# Patient Record
Sex: FEMALE | Race: WHITE | Employment: UNEMPLOYED
[De-identification: names, ages, dates, MRNs, and addresses within clinical notes are randomized per-mention and may not be internally consistent; named-entity substitution may affect disease eponyms.]

---

## 2020-12-16 ENCOUNTER — TELEPHONE (OUTPATIENT)
Dept: CASE MANAGEMENT | Age: 50
End: 2020-12-16

## 2020-12-16 NOTE — TELEPHONE ENCOUNTER
Patient breast surgery education packet assembled. Nurse Navigator is scheduled to met with patient at new patient appointment on 12/17/20 with Dr. Jazmín sAhford.

## 2020-12-16 NOTE — PROGRESS NOTES
Subjective:      Patient ID: Jony Lopez is a 44 y.o. female. HPI  History and Physical    Patient's Name/Date of Birth: Jony Lopez / 1970    Date: 2020      Jony Lopez presents for evaluation of newly diagnosed left DCIS breast cancer. PCP: Nikko Davidson DO, Gynecologist: Dr. Milla Paiz. The lesion is located in the central position of the left breast. The lesion was discovered by mammogram. The patient has noted a change in BSE since presentation (breast pain, \"throbbing\", intermittent). Patient denies nipple discharge. Patient denies a personal history of breast cancer. Breast cancer risk factors include age, gender. Ashkenazi Sikhism Ancestry: No.    OBSTETRIC RELATED HISTORY:  Age of menarche was 14. Patient has not has had menstrual cycles since her ovary removal at age 34. Patient denies hormonal therapy. Patient is . Age of first live birth was 23. Patient did breast feed. Is patient interested in fertility information about fertility preservation?n/a    CANCER SURVEILLANCE HISTORY:  Mammograms: Yes  Breast MRI's: No   Breast Biopsies: Yes   Colonoscopy: Yes   GI Polyps: No   EGD: Yes   Pelvic Exam: Yes   Pap Smear: Yes   Dermatology: No   Lung screening: no      Have you received your flu vaccination this year? Yes  Have you received your Pneumococcal vaccination? No      Estimated body mass index is 30.61 kg/m² as calculated from the following:    Height as of this encounter: 5' 1\" (1.549 m). Weight as of this encounter: 162 lb (73.5 kg). Bra Size: 38 DD    Because violence is so common, we ask all our patients: are you in a relationship or do you live with a person who threatens, hurts, or controls you:  Patient denies. Patient drinks little caffeinated beverages. Patient does not smoke cigarettes. Patient does not use recreational drugs.       Past Medical History:   Diagnosis Date  Diabetes mellitus, type 2 ([facility] [address]        Past Surgical History:   Procedure Laterality Date    BACK SURGERY  2011    EAR SURGERY  06/2020    OVARY REMOVAL  2009       Current Outpatient Medications   Medication Sig Dispense Refill    DULoxetine (CYMBALTA) 30 MG capsule Take 30 mg by mouth daily      traZODone (DESYREL) 100 MG tablet Take 100 mg by mouth nightly      pantoprazole (PROTONIX) 20 MG tablet Take 20 mg by mouth daily      pregabalin (LYRICA) 25 MG capsule Take 25 mg by mouth 2 times daily      naproxen (NAPROSYN) 375 MG tablet Take 1 tablet by mouth 2 times daily as needed for Pain 14 tablet 0     No current facility-administered medications for this visit.         Allergies   Allergen Reactions    Latex Shortness Of Breath    Amoxicillin Shortness Of Breath    Iodine Swelling    Pcn [Penicillins] Hives       Family History   Problem Relation Age of Onset    Cancer Maternal Grandfather         kidney       Social History     Socioeconomic History    Marital status:      Spouse name: Not on file    Number of children: Not on file    Years of education: Not on file    Highest education level: Not on file   Occupational History    Not on file   Social Needs    Financial resource strain: Not on file    Food insecurity     Worry: Not on file     Inability: Not on file    Transportation needs     Medical: Not on file     Non-medical: Not on file   Tobacco Use    Smoking status: Never Smoker   Substance and Sexual Activity    Alcohol use: No    Drug use: No    Sexual activity: Not on file   Lifestyle    Physical activity     Days per week: Not on file     Minutes per session: Not on file    Stress: Not on file   Relationships    Social connections     Talks on phone: Not on file     Gets together: Not on file     Attends Mu-ism service: Not on file     Active member of club or organization: Not on file     Attends meetings of clubs or organizations: Not on file 5. We had a discussion of the treatment options for breast cancer including risks, benefits, and recurrence rates for breast conservation therapy versus mastectomy. We discussed the indications and risks of sentinel lymph node biopsy and possibility of axillary lymph node dissection. We also discussed the possible role of systemic and radiation therapy. NCCN guidelines were utilized in our discussion. The patient was given the appropriate contact numbers and will call with any questions or concerns. Face-to-face time 45 minutes, greater than 50% in counseling, education, and coordination of care. My final recommendation will be communicated back to the referring physician by way of shared medical record and/or written letter via [address] [facility] mail. I personally and independently saw and examined patient and reviewed all pertinent laboratory data and imaging studies. I have reviewed and agree with the CNP history and review of systems as documented in the above note. This document is generated, in part, by voice recognition software and thus syntax and grammatical errors are possible. Rose Lima MD [facility]  December 17, 2020

## 2020-12-17 ENCOUNTER — OFFICE VISIT (OUTPATIENT)
Dept: BREAST CENTER | Age: 50
End: 2020-12-17
Payer: COMMERCIAL

## 2020-12-17 ENCOUNTER — TELEPHONE (OUTPATIENT)
Dept: BREAST CENTER | Age: 50
End: 2020-12-17

## 2020-12-17 VITALS
OXYGEN SATURATION: 98 % | DIASTOLIC BLOOD PRESSURE: 80 MMHG | RESPIRATION RATE: 16 BRPM | BODY MASS INDEX: 30.58 KG/M2 | WEIGHT: 162 LBS | SYSTOLIC BLOOD PRESSURE: 118 MMHG | HEIGHT: 61 IN | TEMPERATURE: 97.3 F | HEART RATE: 98 BPM

## 2020-12-17 PROCEDURE — 99204 OFFICE O/P NEW MOD 45 MIN: CPT | Performed by: SURGERY

## 2020-12-17 PROCEDURE — 99213 OFFICE O/P EST LOW 20 MIN: CPT | Performed by: SURGERY

## 2020-12-17 NOTE — PATIENT INSTRUCTIONS

## 2020-12-17 NOTE — LETTER
[facility]  [address] [address]  Phone: [phone]  Fax: [phone]    Bradley Mccall MD        December 17, 2020     Austin Oates [address] [place] [address]    Patient: Nat Yarbrough  MR Number: [id number]  YOB: 1970  Date of Visit: 12/17/2020    Dear Dr. Oates: Thank you for the request for consultation for Danii Chawla to me for the evaluation of her left DCIS. Below are the relevant portions of my assessment and plan of care. 44 y.o. woman who underwent  a stereotactic left breast core biopsy at central position on December 4 , 2020. Pathological evaluation completed at [place]:       11/25/2020; BILATERAL SCREENING MAMMOGRAM; [address] BIOPSY LEFT BREAST [facility]       12/8/2020; PATHOLOGY; [facility]        12/7/2020 SURGICAL PATHOLOGY REPORT                Clinical stage 0 left breast cancer. Long discussion about options going forward. Has have already seen Dr. Chele Rahman in consultation. Plan on left needle localized lumpectomy/LMAC. Questions answered to patient satisfaction. If you have questions, please do not hesitate to call me. I look forward to following Martinez Gonzalez along with you.     Sincerely,  Bradley Mccall MD

## 2020-12-18 ENCOUNTER — TELEPHONE (OUTPATIENT)
Dept: CASE MANAGEMENT | Age: 50
End: 2020-12-18

## 2020-12-18 NOTE — TELEPHONE ENCOUNTER
Met with patient regarding her recent breast cancer diagnosis. Instructed in detail on her breast biopsy pathology findings including cancer type (DCIS) and hormone receptor status (ER+, UT+). Instructed on next steps including breast surgery options, lymph node biopsy procedures and additional imaging that may be required. Informed patient that this is the beginning of their breast cancer journey and when treatment has been completed she will receive a [address] detailing the events of her specific treatment plan. Provided with extensive literature including \"Be A Survivor: Your guide to breast cancer treatment\", Your Guide to Your Breast Cancer Pathology Report, American Cancer society Exercises after breast surgery and Lymphedema Early Signs and Symptoms. Written materials on local and national support and informational groups. Today patient received copies of their pathology and imaging reports (if available) as well as a list of local medical oncology providers. Provided patient with my contact information, office hours, and encouragement to call me with questions or concerns. Patient verbalizes understanding and appreciative of nurse navigator visit. Emotional support provided and greater than 30 minutes spent with patient. Nurse navigator will continue to follow.

## 2020-12-31 ENCOUNTER — TELEPHONE (OUTPATIENT)
Dept: BREAST CENTER | Age: 50
End: 2020-12-31

## 2021-01-08 ENCOUNTER — TELEPHONE (OUTPATIENT)
Dept: BREAST CENTER | Age: 51
End: 2021-01-08

## 2021-01-08 NOTE — TELEPHONE ENCOUNTER
Patient surgery has been scheduled with surgery scheduling 1/27/21 @ 10:00am / NL 8:00am / Alonso Bledsoe 6:30am - Left breast NL lumpectomy - LMAC - [facility]. Patient notified of date and time of surgery. Patient was instructed to enter the [facility] entrance of the hospital. Patient was instructed NPO after midnight the night prior to surgery. Patient was instructed NO ASA products or blood thinners 5-7 days prior to surgery. Patient instruction and post op instructions sheet mailed to patient. Patient was instructed to bring a sports bra with her day of surgery. Patient will be instructed by PAT about Covid 19 test.  Patient will be tested 72-96 hours prior to surgery. No prior authorization required.   Post op visit - 2/8/21 @ 2:30pm [address] [place] [address] Dr Malone

## 2021-01-11 ENCOUNTER — PREP FOR PROCEDURE (OUTPATIENT)
Dept: SURGERY | Age: 51
End: 2021-01-11

## 2021-01-11 RX ORDER — SODIUM CHLORIDE 0.9 % (FLUSH) 0.9 %
10 SYRINGE (ML) INJECTION PRN
Status: CANCELLED | OUTPATIENT
Start: 2021-01-11

## 2021-01-11 RX ORDER — SODIUM CHLORIDE 0.9 % (FLUSH) 0.9 %
10 SYRINGE (ML) INJECTION EVERY 12 HOURS SCHEDULED
Status: CANCELLED | OUTPATIENT
Start: 2021-01-11

## 2021-01-11 RX ORDER — SODIUM CHLORIDE, SODIUM LACTATE, POTASSIUM CHLORIDE, CALCIUM CHLORIDE 600; 310; 30; 20 MG/100ML; MG/100ML; MG/100ML; MG/100ML
INJECTION, SOLUTION INTRAVENOUS CONTINUOUS
Status: CANCELLED | OUTPATIENT
Start: 2021-01-11

## 2021-01-18 NOTE — PROGRESS NOTES
Patient agreed to COVID test on 1/20 at the  [facility] (2-)   located at  off of [address].between the hours of 6 am- 2:30 pm, instructed to bring ID. Patient instructed to self isolate until day of surgery.

## 2021-01-20 ENCOUNTER — HOSPITAL ENCOUNTER (OUTPATIENT)
Age: 51
Discharge: HOME | End: 2021-01-22
Payer: COMMERCIAL

## 2021-01-20 DIAGNOSIS — U07.1 COVID-19: ICD-10-CM

## 2021-01-20 PROCEDURE — U0003 INFECTIOUS AGENT DETECTION BY NUCLEIC ACID (DNA OR RNA); SEVERE ACUTE RESPIRATORY SYNDROME CORONAVIRUS 2 (SARS-COV-2) (CORONAVIRUS DISEASE [COVID-19]), AMPLIFIED PROBE TECHNIQUE, MAKING USE OF HIGH THROUGHPUT TECHNOLOGIES AS DESCRIBED BY CMS-2020-01-R: HCPCS

## 2021-01-21 LAB
SARS-COV-2: NOT DETECTED
SOURCE: NORMAL

## 2021-01-21 RX ORDER — FEXOFENADINE HCL AND PSEUDOEPHEDRINE HCI 180; 240 MG/1; MG/1
1 TABLET, EXTENDED RELEASE ORAL DAILY
COMMUNITY

## 2021-01-21 RX ORDER — MORPHINE SULFATE 15 MG/1
15 TABLET, FILM COATED, EXTENDED RELEASE ORAL 2 TIMES DAILY
COMMUNITY
Start: 2020-12-15

## 2021-01-21 RX ORDER — LISINOPRIL 2.5 MG/1
2.5 TABLET ORAL DAILY
COMMUNITY
Start: 2020-12-21

## 2021-01-21 RX ORDER — LEVOTHYROXINE SODIUM 0.05 MG/1
50 TABLET ORAL DAILY
COMMUNITY
Start: 2020-12-19

## 2021-01-21 RX ORDER — DULOXETIN HYDROCHLORIDE 60 MG/1
60 CAPSULE, DELAYED RELEASE ORAL DAILY
COMMUNITY
Start: 2020-12-15

## 2021-01-21 RX ORDER — PANTOPRAZOLE SODIUM 40 MG/1
40 TABLET, DELAYED RELEASE ORAL DAILY
COMMUNITY
Start: 2020-11-24

## 2021-01-21 RX ORDER — FLUTICASONE PROPIONATE 50 MCG
SPRAY, SUSPENSION (ML) NASAL DAILY PRN
COMMUNITY
Start: 2020-11-23

## 2021-01-21 RX ORDER — CHLORAL HYDRATE 500 MG
3 CAPSULE ORAL DAILY
COMMUNITY

## 2021-01-21 NOTE — PROGRESS NOTES
[address] PRE-ADMISSION TESTING GENERAL INSTRUCTIONS- [facility]-phone number:[phone]    GENERAL INSTRUCTIONS  [x] Antibacterial Soap shower Night before and/or AM of Surgery    [x] Nothing by mouth after midnight, including gum, candy, mints, or water. [x] You may brush your teeth, gargle, but do NOT swallow water. [x]No smoking, chewing tobacco, illegal drugs, or alcohol within 24 hours of your surgery. [x] Jewelry, valuables or body piercing's should not be brought to the hospital. All body and/or tongue piercing's must be removed prior to arriving to hospital.  ALL hair pins must be removed. [x] Do not wear makeup, lotions, powders, deodorant. Nail polish as directed by the nurse. [x] Arrange transportation with a responsible adult  to and from the hospital. If you do not have a responsible adult  to transport you, you will need to make arrangements with a medical transportation company (i.e. [employer]. A Uber/taxi/bus is not appropriate unless you are accompanied by a responsible adult ). Arrange for someone to be with you for the remainder of the day and for 24 hours after your procedure due to having had anesthesia. Who will be your  for transportation? Jamieh Barrier, spouse  Who will be staying with you for 24 hrs after your procedure? Bryanna Barrier, spouse  [x] [employer] card and photo ID. PARKING INSTRUCTIONS:   [x] Arrival Time:6:30 am Park on [facility], enter the main entrance. One person may accompany you. Wear a mask. [x] To reach the [facility] lobby from [facility], upon entering the hospital, take elevator B to the 3rd floor. Check in with the . A parking token will be provided if needed. EDUCATION INSTRUCTIONS:          [x]Pain: Post-op pain is normal and to be expected. You will be asked to rate your pain from 0-10 (a zero is not acceptable-education is needed).  Your post-op pain goal is:  Patient has a pain

## 2021-01-25 NOTE — PROGRESS NOTES
Left message with patient and spoke to her  to notify of time change for surgery to 0930 and to be here at 0600.

## 2021-01-25 NOTE — H&P
Patient ID: Cydney Lopez is a 44 y.o. female.     HPI  History and Physical     Patient's Name/Date of Birth: Cydney Lopez / 1970        Cydney Lopez presents for needle localized lumpectomy for left DCIS breast cancer.      PCP: Keri Douglas DO, Gynecologist: Dr. Jesi Mckeon.     The lesion is located in the central position of the left breast. The lesion was discovered by mammogram. The patient has noted a change in BSE since presentation (breast pain, \"throbbing\", intermittent). Patient denies nipple discharge. Patient denies a personal history of breast cancer. Breast cancer risk factors include age, gender. Ashkenazi Baptist Ancestry: No.     OBSTETRIC RELATED HISTORY:  Age of menarche was 14. Patient has not has had menstrual cycles since her ovary removal at age 34. Patient denies hormonal therapy. Patient is . Age of first live birth was 23. Patient did breast feed. Is patient interested in fertility information about fertility preservation?n/a     CANCER SURVEILLANCE HISTORY:  Mammograms: Yes  Breast MRI's: No   Breast Biopsies: Yes   Colonoscopy: Yes   GI Polyps: No   EGD: Yes   Pelvic Exam: Yes   Pap Smear: Yes   Dermatology: No   Lung screening: no        Have you received your flu vaccination this year? Yes  Have you received your Pneumococcal vaccination? No        Estimated body mass index is 30.61 kg/m² as calculated from the following:    Height as of this encounter: 5' 1\" (1.549 m). Weight as of this encounter: 162 lb (73.5 kg). Bra Size: 38 DD     Because violence is so common, we ask all our patients: are you in a relationship or do you live with a person who threatens, hurts, or controls you:  Patient denies.     Patient drinks little caffeinated beverages. Patient does not smoke cigarettes.  Patient does not use recreational drugs.        Past Medical History        Past Medical History:   Diagnosis Date    Diabetes mellitus, type 2 [facility])              Past Surgical History         Past Surgical History:   Procedure Laterality Date    BACK SURGERY   2011    EAR SURGERY   06/2020    OVARY REMOVAL   2009            Current Facility-Administered Medications          Current Outpatient Medications   Medication Sig Dispense Refill    DULoxetine (CYMBALTA) 30 MG capsule Take 30 mg by mouth daily        traZODone (DESYREL) 100 MG tablet Take 100 mg by mouth nightly        pantoprazole (PROTONIX) 20 MG tablet Take 20 mg by mouth daily        pregabalin (LYRICA) 25 MG capsule Take 25 mg by mouth 2 times daily        naproxen (NAPROSYN) 375 MG tablet Take 1 tablet by mouth 2 times daily as needed for Pain 14 tablet 0      No current facility-administered medications for this visit.             Allergies   Allergen Reactions    Latex Shortness Of Breath    Amoxicillin Shortness Of Breath    Iodine Swelling    Pcn [Penicillins] Hives         Family History         Family History   Problem Relation Age of Onset    Cancer Maternal Grandfather           kidney            Social History               Socioeconomic History    Marital status:        Spouse name: Not on file    Number of children: Not on file    Years of education: Not on file    Highest education level: Not on file   Occupational History    Not on file   Social Needs    Financial resource strain: Not on file    Food insecurity       Worry: Not on file       Inability: Not on file    Transportation needs       Medical: Not on file       Non-medical: Not on file   Tobacco Use    Smoking status: Never Smoker   Substance and Sexual Activity    Alcohol use: No    Drug use: No    Sexual activity: Not on file   Lifestyle    Physical activity       Days per week: Not on file       Minutes per session: Not on file    Stress: Not on file   Relationships    Social connections       Talks on phone: Not on file       Gets together: Not on file       Attends Caodaism service: Not on file       Active member of club or organization: Not on file       Attends meetings of clubs or organizations: Not on file       Relationship status: Not on file    Intimate partner violence       Fear of current or ex partner: Not on file       Emotionally abused: Not on file       Physically abused: Not on file       Forced sexual activity: Not on file   Other Topics Concern    Not on file   Social History Narrative    Not on file            Occupation: Patient retired. No heavy lifting activities. Enjoys natural diet interventions.            Review of Systems  CONSTITUTIONAL: No fever, chills. Good appetite and energy level. ENMT: Eyes: No diplopia; Nose: No epistaxis. Mouth: No sore throat. RESPIRATORY: No hemoptysis, shortness of breath, cough. CARDIOVASCULAR: No chest pain, pressure, or palpitations. GASTROINTESTINAL: No nausea/vomiting, abdominal pain, diarrhea/constipation. No blood in the stools. GENITOURINARY: No dysuria, urinary frequency, hematuria. NEURO: No syncope, presyncope, headache. Remainder:  ROS NEGATIVE     Patient denies previous history of DVT/PE. Review of systems as noted above completely reviewed with patient. No changes.             Objective:   Physical Exam  Vitals signs and nursing note reviewed. Constitutional:       General: She is not in acute distress. Appearance: She is well-developed. She is not diaphoretic. HENT:      Head: Normocephalic and atraumatic. Eyes:      Conjunctiva/sclera: Conjunctivae normal.      Pupils: Pupils are equal, round, and reactive to light. Neck:      Musculoskeletal: Normal range of motion and neck supple. Thyroid: No thyromegaly. Trachea: No tracheal deviation. Cardiovascular:      Rate and Rhythm: Normal rate and regular rhythm. Heart sounds: Normal heart sounds. Pulmonary:      Effort: Pulmonary effort is normal. No respiratory distress. Breath sounds: Normal breath sounds. Chest:      Breasts: Breasts are symmetrical.         Right: No inverted nipple, mass, nipple discharge, skin change or tenderness. Left: Mass and skin change (ecchymoses) present. No inverted nipple, nipple discharge or tenderness. Abdominal:      General: There is no distension. Palpations: Abdomen is soft. Musculoskeletal:      Right shoulder: She exhibits normal range of motion. Left shoulder: She exhibits normal range of motion. Lymphadenopathy:      Cervical: No cervical adenopathy. Upper Body:      Right upper body: No supraclavicular adenopathy. Left upper body: No supraclavicular adenopathy. Skin:     General: Skin is warm and dry. Coloration: Skin is not pale. Findings: No erythema. Neurological:      Mental Status: She is alert and oriented to person, place, and time. Psychiatric:         Behavior: Behavior normal.         Thought Content: Thought content normal.         Judgment: Judgment normal.                        Assessment:   *44 y.o. woman who underwent  a stereotactic left breast core biopsy at central position on December 4 , 2020. Pathological evaluation completed at [place]:        11/25/2020; BILATERAL SCREENING MAMMOGRAM; [address] [facility] BIOPSY LEFT BREAST [facility]        12/8/2020; PATHOLOGY; [facility]          12/7/2020 SURGICAL PATHOLOGY REPORT                     Clinical stage 0 left breast cancer. Long discussion about options going forward. Has have already seen Dr. Claudio Mcclain in consultation. Plan on left needle localized lumpectomy/LMAC. Questions answered to patient satisfaction. Plan: Left needle localized lumpectomy.     The risks, benefits, expected outcomes, and alternatives to this procedure have been discussed with the patient, which include but are not limited to bleeding, infection, flap necrosis and medical complications to anesthesia or surgery such as pneumonia, heart

## 2021-01-26 ENCOUNTER — ANESTHESIA EVENT (OUTPATIENT)
Dept: OPERATING ROOM | Age: 51
End: 2021-01-26
Payer: COMMERCIAL

## 2021-01-27 ENCOUNTER — ANESTHESIA (OUTPATIENT)
Dept: OPERATING ROOM | Age: 51
End: 2021-01-27
Payer: COMMERCIAL

## 2021-01-27 ENCOUNTER — HOSPITAL ENCOUNTER (OUTPATIENT)
Age: 51
Setting detail: OUTPATIENT SURGERY
Discharge: HOME | End: 2021-01-27
Attending: SURGERY | Admitting: SURGERY
Payer: COMMERCIAL

## 2021-01-27 ENCOUNTER — APPOINTMENT (OUTPATIENT)
Dept: GENERAL RADIOLOGY | Age: 51
End: 2021-01-27
Attending: SURGERY
Payer: COMMERCIAL

## 2021-01-27 ENCOUNTER — HOSPITAL ENCOUNTER (OUTPATIENT)
Dept: GENERAL RADIOLOGY | Age: 51
Setting detail: OUTPATIENT SURGERY
Discharge: HOME | End: 2021-01-29
Attending: SURGERY
Payer: COMMERCIAL

## 2021-01-27 VITALS
DIASTOLIC BLOOD PRESSURE: 57 MMHG | WEIGHT: 164 LBS | BODY MASS INDEX: 30.18 KG/M2 | HEIGHT: 62 IN | HEART RATE: 101 BPM | RESPIRATION RATE: 16 BRPM | OXYGEN SATURATION: 97 % | TEMPERATURE: 97 F | SYSTOLIC BLOOD PRESSURE: 102 MMHG

## 2021-01-27 VITALS
RESPIRATION RATE: 9 BRPM | OXYGEN SATURATION: 100 % | SYSTOLIC BLOOD PRESSURE: 70 MMHG | DIASTOLIC BLOOD PRESSURE: 49 MMHG

## 2021-01-27 DIAGNOSIS — Z01.818 PRE-OP TESTING: ICD-10-CM

## 2021-01-27 DIAGNOSIS — D05.12 DUCTAL CARCINOMA IN SITU (DCIS) OF LEFT BREAST: ICD-10-CM

## 2021-01-27 DIAGNOSIS — U07.1 COVID-19: Primary | ICD-10-CM

## 2021-01-27 LAB — METER GLUCOSE: 102 MG/DL (ref 74–99)

## 2021-01-27 PROCEDURE — 3700000000 HC ANESTHESIA ATTENDED CARE: Performed by: SURGERY

## 2021-01-27 PROCEDURE — 2720000010 HC SURG SUPPLY STERILE: Performed by: SURGERY

## 2021-01-27 PROCEDURE — 88307 TISSUE EXAM BY PATHOLOGIST: CPT

## 2021-01-27 PROCEDURE — 19125 EXCISION BREAST LESION: CPT | Performed by: SURGERY

## 2021-01-27 PROCEDURE — 82962 GLUCOSE BLOOD TEST: CPT

## 2021-01-27 PROCEDURE — 3600000013 HC SURGERY LEVEL 3 ADDTL 15MIN: Performed by: SURGERY

## 2021-01-27 PROCEDURE — 2500000003 HC RX 250 WO HCPCS: Performed by: SURGERY

## 2021-01-27 PROCEDURE — 2580000003 HC RX 258: Performed by: SURGERY

## 2021-01-27 PROCEDURE — 3700000001 HC ADD 15 MINUTES (ANESTHESIA): Performed by: SURGERY

## 2021-01-27 PROCEDURE — A4648 IMPLANTABLE TISSUE MARKER: HCPCS

## 2021-01-27 PROCEDURE — 3600000003 HC SURGERY LEVEL 3 BASE: Performed by: SURGERY

## 2021-01-27 PROCEDURE — 7100000011 HC PHASE II RECOVERY - ADDTL 15 MIN: Performed by: SURGERY

## 2021-01-27 PROCEDURE — 2709999900 HC NON-CHARGEABLE SUPPLY: Performed by: SURGERY

## 2021-01-27 PROCEDURE — 6360000002 HC RX W HCPCS

## 2021-01-27 PROCEDURE — 76098 X-RAY EXAM SURGICAL SPECIMEN: CPT

## 2021-01-27 PROCEDURE — 7100000010 HC PHASE II RECOVERY - FIRST 15 MIN: Performed by: SURGERY

## 2021-01-27 PROCEDURE — 2500000003 HC RX 250 WO HCPCS

## 2021-01-27 RX ORDER — KETOROLAC TROMETHAMINE 30 MG/ML
INJECTION, SOLUTION INTRAMUSCULAR; INTRAVENOUS PRN
Status: DISCONTINUED | OUTPATIENT
Start: 2021-01-27 | End: 2021-01-27 | Stop reason: SDUPTHER

## 2021-01-27 RX ORDER — ONDANSETRON 2 MG/ML
INJECTION INTRAMUSCULAR; INTRAVENOUS PRN
Status: DISCONTINUED | OUTPATIENT
Start: 2021-01-27 | End: 2021-01-27 | Stop reason: SDUPTHER

## 2021-01-27 RX ORDER — SODIUM CHLORIDE, SODIUM LACTATE, POTASSIUM CHLORIDE, CALCIUM CHLORIDE 600; 310; 30; 20 MG/100ML; MG/100ML; MG/100ML; MG/100ML
INJECTION, SOLUTION INTRAVENOUS CONTINUOUS
Status: DISCONTINUED | OUTPATIENT
Start: 2021-01-27 | End: 2021-01-27 | Stop reason: HOSPADM

## 2021-01-27 RX ORDER — HYDROCODONE BITARTRATE AND ACETAMINOPHEN 5; 325 MG/1; MG/1
1 TABLET ORAL PRN
Status: DISCONTINUED | OUTPATIENT
Start: 2021-01-27 | End: 2021-01-27 | Stop reason: HOSPADM

## 2021-01-27 RX ORDER — HYDROCODONE BITARTRATE AND ACETAMINOPHEN 5; 325 MG/1; MG/1
2 TABLET ORAL PRN
Status: DISCONTINUED | OUTPATIENT
Start: 2021-01-27 | End: 2021-01-27 | Stop reason: HOSPADM

## 2021-01-27 RX ORDER — FENTANYL CITRATE 50 UG/ML
INJECTION, SOLUTION INTRAMUSCULAR; INTRAVENOUS PRN
Status: DISCONTINUED | OUTPATIENT
Start: 2021-01-27 | End: 2021-01-27 | Stop reason: SDUPTHER

## 2021-01-27 RX ORDER — CLINDAMYCIN PHOSPHATE 900 MG/50ML
900 INJECTION INTRAVENOUS ONCE
Status: COMPLETED | OUTPATIENT
Start: 2021-01-27 | End: 2021-01-27

## 2021-01-27 RX ORDER — SODIUM CHLORIDE 0.9 % (FLUSH) 0.9 %
10 SYRINGE (ML) INJECTION EVERY 12 HOURS SCHEDULED
Status: DISCONTINUED | OUTPATIENT
Start: 2021-01-27 | End: 2021-01-27 | Stop reason: HOSPADM

## 2021-01-27 RX ORDER — LIDOCAINE HYDROCHLORIDE 20 MG/ML
INJECTION, SOLUTION INTRAVENOUS PRN
Status: DISCONTINUED | OUTPATIENT
Start: 2021-01-27 | End: 2021-01-27 | Stop reason: SDUPTHER

## 2021-01-27 RX ORDER — MIDAZOLAM HYDROCHLORIDE 1 MG/ML
INJECTION INTRAMUSCULAR; INTRAVENOUS PRN
Status: DISCONTINUED | OUTPATIENT
Start: 2021-01-27 | End: 2021-01-27 | Stop reason: SDUPTHER

## 2021-01-27 RX ORDER — SODIUM CHLORIDE 0.9 % (FLUSH) 0.9 %
10 SYRINGE (ML) INJECTION PRN
Status: DISCONTINUED | OUTPATIENT
Start: 2021-01-27 | End: 2021-01-27 | Stop reason: HOSPADM

## 2021-01-27 RX ORDER — BUPIVACAINE HYDROCHLORIDE AND EPINEPHRINE 5; 5 MG/ML; UG/ML
INJECTION, SOLUTION EPIDURAL; INTRACAUDAL; PERINEURAL PRN
Status: DISCONTINUED | OUTPATIENT
Start: 2021-01-27 | End: 2021-01-27 | Stop reason: ALTCHOICE

## 2021-01-27 RX ORDER — ACETAMINOPHEN 500 MG
500 TABLET ORAL 4 TIMES DAILY PRN
Qty: 120 TABLET | Refills: 0 | COMMUNITY
Start: 2021-01-27

## 2021-01-27 RX ORDER — IBUPROFEN 600 MG/1
600 TABLET ORAL 4 TIMES DAILY PRN
Qty: 40 TABLET | Refills: 0 | COMMUNITY
Start: 2021-01-27

## 2021-01-27 RX ORDER — PROPOFOL 10 MG/ML
INJECTION, EMULSION INTRAVENOUS CONTINUOUS PRN
Status: DISCONTINUED | OUTPATIENT
Start: 2021-01-27 | End: 2021-01-27 | Stop reason: SDUPTHER

## 2021-01-27 RX ADMIN — PHENYLEPHRINE HYDROCHLORIDE 100 MCG: 10 INJECTION INTRAVENOUS at 10:21

## 2021-01-27 RX ADMIN — MIDAZOLAM 2 MG: 1 INJECTION INTRAMUSCULAR; INTRAVENOUS at 09:45

## 2021-01-27 RX ADMIN — KETOROLAC TROMETHAMINE 30 MG: 30 INJECTION, SOLUTION INTRAMUSCULAR; INTRAVENOUS at 10:56

## 2021-01-27 RX ADMIN — SODIUM CHLORIDE, POTASSIUM CHLORIDE, SODIUM LACTATE AND CALCIUM CHLORIDE: 600; 310; 30; 20 INJECTION, SOLUTION INTRAVENOUS at 09:49

## 2021-01-27 RX ADMIN — ONDANSETRON HYDROCHLORIDE 4 MG: 2 INJECTION, SOLUTION INTRAMUSCULAR; INTRAVENOUS at 10:56

## 2021-01-27 RX ADMIN — PHENYLEPHRINE HYDROCHLORIDE 100 MCG: 10 INJECTION INTRAVENOUS at 10:41

## 2021-01-27 RX ADMIN — FENTANYL CITRATE 25 MCG: 50 INJECTION, SOLUTION INTRAMUSCULAR; INTRAVENOUS at 10:09

## 2021-01-27 RX ADMIN — PHENYLEPHRINE HYDROCHLORIDE 50 MCG: 10 INJECTION INTRAVENOUS at 10:56

## 2021-01-27 RX ADMIN — FENTANYL CITRATE 25 MCG: 50 INJECTION, SOLUTION INTRAMUSCULAR; INTRAVENOUS at 10:36

## 2021-01-27 RX ADMIN — CLINDAMYCIN IN 5 PERCENT DEXTROSE 900 MG: 18 INJECTION, SOLUTION INTRAVENOUS at 09:52

## 2021-01-27 RX ADMIN — FENTANYL CITRATE 50 MCG: 50 INJECTION, SOLUTION INTRAMUSCULAR; INTRAVENOUS at 09:52

## 2021-01-27 RX ADMIN — PHENYLEPHRINE HYDROCHLORIDE 100 MCG: 10 INJECTION INTRAVENOUS at 11:01

## 2021-01-27 RX ADMIN — LIDOCAINE HYDROCHLORIDE 40 MG: 20 INJECTION, SOLUTION INTRAVENOUS at 09:52

## 2021-01-27 RX ADMIN — PHENYLEPHRINE HYDROCHLORIDE 100 MCG: 10 INJECTION INTRAVENOUS at 10:31

## 2021-01-27 RX ADMIN — PROPOFOL 100 MCG/KG/MIN: 10 INJECTION, EMULSION INTRAVENOUS at 09:52

## 2021-01-27 RX ADMIN — PHENYLEPHRINE HYDROCHLORIDE 100 MCG: 10 INJECTION INTRAVENOUS at 10:50

## 2021-01-27 ASSESSMENT — PULMONARY FUNCTION TESTS
PIF_VALUE: 0

## 2021-01-27 ASSESSMENT — PAIN SCALES - GENERAL: PAINLEVEL_OUTOF10: 0

## 2021-01-27 ASSESSMENT — PAIN - FUNCTIONAL ASSESSMENT: PAIN_FUNCTIONAL_ASSESSMENT: 0-10

## 2021-01-27 NOTE — OP NOTE
Operative Note      Patient: Sathish Christie  YOB: 1970  MRN: [id number]    Date of Procedure: 1/27/2021    Pre-Op Diagnosis: BREAST CANCER, DCIS, left    Post-Op Diagnosis: Same       Procedure(s):  LEFT BREAST NEEDLE LOCALIZED  LUMPECTOMY --TRIDENT    Surgeon(s):  Susie Brown MD    Assistant:   Resident: Nikita Peterson DO    Anesthesia: Monitor Anesthesia Care    Estimated Blood Loss (mL): less than 50     Complications: None    Specimens:   ID Type Source Tests Collected by Time Destination   A : Left lumpectomy 6:00 Tissue Breast SURGICAL PATHOLOGY Susie Brown MD 1/27/2021 1047        Implants:  * No implants in log *      Drains: * No LDAs found *    Findings: Breast neoplasm containing localizing needle    Detailed Description of Procedure: The patient was brought to the operating room and positioned supine on the OR table. Sequential compression devices were placed on the patient's lower extremities and functioning. Preoperative antibiotics were administered. Anesthesia was obtained without complication as per the anesthesia record. Immediately prior to the procedure a time-out was called and the surgical checklist was reviewed. The patient was prepped and draped in the usual sterile fashion and the procedure went forth with strict aseptic technique under maximal barrier precautions. The left breast mass had been previously needle localized per Radiology. A curved elliptical incision was made using the PEAK Plasma Blade in the inferior portion of the dermal areolar junction. Dissection was continued using the Plasma Blade. Once palpable within the tissue, the localizing needle was grasped with Allis clamps. Dissection was carried down onto the localizing needle several centimeters proximal to the mass. The needle was cut with wire cutters and the cut external portion of the needle was passed off the field. Dissection continued using the Plasma Blade for the remaining margins. Circumferentially excellent margins were obtained. This was later confirmed by intraoperative specimen mammography. Bleeding points were all cauterized. The lumpectomy cavity was packed with a lap sponge. The specimen was removed and brought to the back table where 6 colors of paint were applied to orient it. The wound was irrigated, inspected, bleeding points were cauterized. Once hemostasis was obtained, the margins were marked with stainless steel clips. The subcutaneous tissue was approximated with 3-0 Vicryl. Dermis was approximated with interrupted 4-0 Vicryl sutures and the skin was approximated with a running subcuticular 5-0 Vicryl suture. Needle, sponge, and instrument counts were reported as correct x2. The patient tolerated the procedure well without complications. Dr. Oly Goldman was present and scrubbed throughout the case.     Electronically signed by Yelena Jauregui DO on 1/27/2021 at 11:21 AM

## 2021-01-27 NOTE — ANESTHESIA PRE PROCEDURE
Department of Anesthesiology  Preprocedure Note       Name:  Bryant Henderson   Age:  44 y.o.  :  1970                                          MRN:  [id number]         Date:  2021      Surgeon: Radha Booker):  Darius Arizmendi MD    Procedure: Procedure(s):  LEFT BREAST NEEDLE LOCALIZED  LUMPECTOMY --TRIDENT    Medications prior to admission:   Prior to Admission medications    Medication Sig Start Date End Date Taking? Authorizing Provider   morphine (MS CONTIN) 15 MG extended release tablet Take 15 mg by mouth 2 times daily. 12/15/20  Yes Historical Provider, MD   pantoprazole (PROTONIX) 40 MG tablet Take 40 mg by mouth daily 20  Yes Historical Provider, MD   LYRICA 75 MG capsule Take 75 mg by mouth 2 times daily.  20  Yes Historical Provider, MD   metFORMIN (GLUCOPHAGE) 500 MG tablet Take 500 mg by mouth 2 times daily 20  Yes Historical Provider, MD   lisinopril (PRINIVIL;ZESTRIL) 2.5 MG tablet Take 2.5 mg by mouth daily 20  Yes Historical Provider, MD   Cholecalciferol (VITAMIN D3) 25 MCG (1000 UT) CAPS Take 1,000 Int'l Units by mouth daily 20  Yes Historical Provider, MD   DULoxetine (CYMBALTA) 60 MG extended release capsule Take 60 mg by mouth daily 12/15/20  Yes Historical Provider, MD   levothyroxine (SYNTHROID) 50 MCG tablet Take 50 mcg by mouth daily 20  Yes Historical Provider, MD   fluticasone (FLONASE) 50 MCG/ACT nasal spray by Each Nostril route daily as needed 2 sprays each nostril 20  Yes Historical Provider, MD   fexofenadine-pseudoephedrine (ALLEGRA-D 24HR) 180-240 MG per extended release tablet Take 1 tablet by mouth daily   Yes Historical Provider, MD   Probiotic Product (PRO-BIOTIC BLEND) CAPS Take 3 tablets by mouth daily   Yes Historical Provider, MD   [place]-3 1000 MG CAPS Take 3 capsules by mouth daily With Omega 7 combination capsule gel   Yes Historical Provider, MD NONFORMULARY Take by mouth daily Pre - biotic  \"mixes 1 tsp with 16 ounces of water\"   Yes Historical Provider, MD   traZODone (DESYREL) 100 MG tablet Take 100 mg by mouth nightly    Historical Provider, MD       Current medications:    Current Facility-Administered Medications   Medication Dose Route Frequency Provider Last Rate Last Admin    ceFAZolin (ANCEF) 2000 mg in sterile water 20 mL IV syringe  2 g Intravenous On Call to [facility], MD        lactated ringers infusion   Intravenous Continuous Rosio Mercer MD        sodium chloride flush 0.9 % injection 10 mL  10 mL Intravenous 2 times per day Rosio Mercer MD        sodium chloride flush 0.9 % injection 10 mL  10 mL Intravenous PRN Rosio Mercer MD        HYDROcodone-acetaminophen [facility]) 5-325 MG per tablet 1 tablet  1 tablet Oral PRN Janes Mancuso MD        Or    HYDROcodone-acetaminophen (NORCO) 5-325 MG per tablet 2 tablet  2 tablet Oral PRN Janes Mancuso MD           Allergies: Allergies   Allergen Reactions    Latex Shortness Of Breath    Amoxicillin Shortness Of Breath    Iodine Swelling    Pcn [Penicillins] Hives       Problem List:  There is no problem list on file for this patient. Past Medical History:        Diagnosis Date    Diabetes mellitus, type 2 [facility])        Past Surgical History:        Procedure Laterality Date    BACK SURGERY  2011    EAR SURGERY  06/2020    ENDOMETRIAL ABLATION  2008    OVARY REMOVAL  2009    SPINAL FUSION  07/17/2011    360 fusion L5-S1       Social History:    Social History     Tobacco Use    Smoking status: Never Smoker    Smokeless tobacco: Never Used   Substance Use Topics    Alcohol use:  No                                Counseling given: Not Answered      Vital Signs (Current):   Vitals:    01/21/21 1413 01/27/21 0615   BP:  (!) 140/67   Pulse:  95   Resp:  20   Temp:  97 °F (36.1 °C)   TempSrc:  Temporal   SpO2:  97%   Weight: 164 lb (74.4 kg) 164 lb (74.4 kg) Height: 5' 1.5\" (1.562 m) 5' 1.5\" (1.562 m)                                              BP Readings from Last 3 Encounters:   01/27/21 (!) 140/67   12/17/20 118/80       NPO Status: Time of last liquid consumption: 2330                        Time of last solid consumption: 2000                        Date of last liquid consumption: 01/26/21                        Date of last solid food consumption: 01/26/21    BMI:   Wt Readings from Last 3 Encounters:   01/27/21 164 lb (74.4 kg)   12/17/20 162 lb (73.5 kg)     Body mass index is 30.49 kg/m². CBC:   Lab Results   Component Value Date    WBC 17.7 07/24/2011    RBC 3.15 07/24/2011    HGB 9.4 07/24/2011    HCT 27.9 07/24/2011    MCV 88.6 07/24/2011    RDW 13.0 07/24/2011     07/24/2011       CMP: No results found for: NA, K, CL, CO2, BUN, CREATININE, GFRAA, AGRATIO, LABGLOM, GLUCOSE, PROT, CALCIUM, BILITOT, ALKPHOS, AST, ALT    POC Tests: No results for input(s): POCGLU, POCNA, POCK, POCCL, POCBUN, POCHEMO, POCHCT in the last 72 hours.     Coags: No results found for: PROTIME, INR, APTT    HCG (If Applicable): No results found for: PREGTESTUR, PREGSERUM, HCG, HCGQUANT     ABGs: No results found for: PHART, PO2ART, NXK1OWF, LYO5NTM, BEART, Y2QYWHRB     Type & Screen (If Applicable):  No results found for: LABABO, LABRH    Drug/Infectious Status (If Applicable):  No results found for: HIV, HEPCAB    COVID-19 Screening (If Applicable):   Lab Results   Component Value Date    COVID19 Not Detected 01/20/2021         Anesthesia Evaluation  Patient summary reviewed no history of anesthetic complications:   Airway: Mallampati: II  TM distance: >3 FB   Neck ROM: full   Dental:    (+) lower dentures and upper dentures      Pulmonary:Negative Pulmonary ROS breath sounds clear to auscultation                             Cardiovascular:    (+) hypertension:,         Rhythm: regular  Rate: normal           Beta Blocker:  Not on Beta Blocker         Neuro/Psych: Negative Neuro/Psych ROS              GI/Hepatic/Renal:   (+) GERD:,           Endo/Other:    (+) DiabetesType II DM, , hypothyroidism::., .                  ROS comment: obese Abdominal:           Vascular: negative vascular ROS. Anesthesia Plan      MAC     ASA 3       Induction: intravenous. Anesthetic plan and risks discussed with patient. Plan discussed with CRNA.                   Gayatri Retana MD   1/27/2021

## 2021-01-27 NOTE — PROGRESS NOTES
COVID testing completed on: 1/20/21  Results of the test: negative  The patient verbally confirms that they did adhere to the self-quarantine guidelines. No signs or symptoms expressed or observed.

## 2021-01-27 NOTE — ANESTHESIA POSTPROCEDURE EVALUATION
Department of Anesthesiology  Postprocedure Note    Patient: Margo Jc  MRN: [id number]  YOB: 1970  Date of evaluation: 1/27/2021  Time:  12:08 PM     Procedure Summary     Date: 01/27/21 Room / Location: [place] [address] / [facility]    Anesthesia Start: 0561 Anesthesia Stop: 3620    Procedure: LEFT BREAST NEEDLE LOCALIZED  LUMPECTOMY --TRIDENT (Left Breast) Diagnosis: (BREAST CANCER)    Surgeons: Maribell Madison MD Responsible Provider: Marianne Pathak MD    Anesthesia Type: MAC ASA Status: 3          Anesthesia Type: MAC    Kelby Phase I: Kelby Score: 10    Kelby Phase II: Kelby Score: 10    Last vitals: Reviewed and per EMR flowsheets.        Anesthesia Post Evaluation    Patient location during evaluation: PACU  Patient participation: complete - patient participated  Level of consciousness: awake and alert  Airway patency: patent  Nausea & Vomiting: no nausea and no vomiting  Complications: no  Cardiovascular status: hemodynamically stable  Respiratory status: acceptable  Hydration status: euvolemic

## 2021-01-28 ENCOUNTER — TELEPHONE (OUTPATIENT)
Dept: SURGERY | Age: 51
End: 2021-01-28

## 2021-02-02 ENCOUNTER — TELEPHONE (OUTPATIENT)
Dept: BREAST CENTER | Age: 51
End: 2021-02-02

## 2021-02-03 ENCOUNTER — TELEPHONE (OUTPATIENT)
Dept: SURGERY | Age: 51
End: 2021-02-03

## 2021-02-04 ENCOUNTER — TELEPHONE (OUTPATIENT)
Dept: BREAST CENTER | Age: 51
End: 2021-02-04

## 2021-02-04 NOTE — TELEPHONE ENCOUNTER
Called patient and left message to discuss need for post op visit on 2/8/2021. Told patient if she is feeling ok we can postpone her post op visit until after her re-excision. Also explained, our office would be in touch with her for a surgery date and time.

## 2021-02-04 NOTE — TELEPHONE ENCOUNTER
Patient returned call. Discussed results of surgical pathology. Patient agrees to proceed with a re-excision. She feels well overall. Post op appointment 2/8/21.

## 2021-02-10 NOTE — PROGRESS NOTES
Patient agreed to COVID test on 02/12/21 at the  [facility] (2-)   located at  off of [address].between the hours of 6 am- 2:30 pm, instructed to bring ID. Patient instructed to self isolate until day of surgery.

## 2021-02-11 ENCOUNTER — TELEPHONE (OUTPATIENT)
Dept: BREAST CENTER | Age: 51
End: 2021-02-11

## 2021-02-11 NOTE — TELEPHONE ENCOUNTER
Patient surgery has been scheduled with surgery scheduling 2/17/21 @ 8:30am / Arrival 6:30am -- Left breast re-excision of margins - LMAC. Post op visit 3/1/21 @ 11:30am [facility]. Patient notified of date and time of surgery. Patient was instructed to enter the [facility] entrance of the hospital. Patient was instructed NPO after midnight the night prior to surgery. Patient was instructed NO ASA products or blood thinners 5-7 days prior to surgery. Patient instruction and post op instructions sheet mailed to patient. Patient was instructed to bring a sports bra with her day of surgery. Patient will be instructed by PAT about Covid 19 test.  Patient will be tested 72-96 hours prior to surgery. No prior authorization required.

## 2021-02-12 ENCOUNTER — HOSPITAL ENCOUNTER (OUTPATIENT)
Age: 51
Discharge: HOME | End: 2021-02-14
Payer: COMMERCIAL

## 2021-02-12 DIAGNOSIS — U07.1 COVID-19: ICD-10-CM

## 2021-02-12 PROCEDURE — U0003 INFECTIOUS AGENT DETECTION BY NUCLEIC ACID (DNA OR RNA); SEVERE ACUTE RESPIRATORY SYNDROME CORONAVIRUS 2 (SARS-COV-2) (CORONAVIRUS DISEASE [COVID-19]), AMPLIFIED PROBE TECHNIQUE, MAKING USE OF HIGH THROUGHPUT TECHNOLOGIES AS DESCRIBED BY CMS-2020-01-R: HCPCS

## 2021-02-12 PROCEDURE — U0002 COVID-19 LAB TEST NON-CDC: HCPCS

## 2021-02-12 NOTE — PROGRESS NOTES
[address] PRE-ADMISSION TESTING GENERAL INSTRUCTIONS- [facility]-phone number:[phone]    GENERAL INSTRUCTIONS  [x] Antibacterial Soap shower Night before and/or AM of Surgery  [] Jose Guadalupe wipe instruction sheet and wipes given. [x] Nothing by mouth after midnight, including gum, candy, mints, or water. [x] You may brush your teeth, gargle, but do NOT swallow water. []Hibiclens shower  the night before and the morning of surgery. Do not use             Hibiclens on your face or head. [x]No smoking, chewing tobacco, illegal drugs, or alcohol within 24 hours of your surgery. [x] Jewelry, valuables or body piercing's should not be brought to the hospital. All body and/or tongue piercing's must be removed prior to arriving to hospital.  ALL hair pins must be removed. [x] Do not wear makeup, lotions, powders, deodorant. Nail polish as directed by the nurse. [x] Arrange transportation with a responsible adult  to and from the hospital. If you do not have a responsible adult  to transport you, you will need to make arrangements with a medical transportation company (i.e. [employer]. A Uber/taxi/bus is not appropriate unless you are accompanied by a responsible adult ). Arrange for someone to be with you for the remainder of the day and for 24 hours after your procedure due to having had anesthesia. Who will be your  for transportation?    Who will be staying with you for 24 hrs after your procedure?   [x] Bring insurance card and photo ID.  [] Transfusion Bracelet: Please bring with you to hospital, day of surgery  [] Bring urine specimen day of surgery. Any small container is acceptable. [] Use inhalers the morning of surgery and bring with you to hospital.  [] Bring copy of living will or healthcare power of  papers to be placed in your electronic record.   [] CPAP/BI-PAP: Please bring your machine if you are to spend the night in the hospital.     PARKING INSTRUCTIONS:   [x] Arrival Time: 0630, main entrance, wear mask  · [] Parking lot '\"I\"  is located on [facility] (the corner of [facility] and [facility]). To enter, press the button and the gate will lift. A free token will be provided to exit the lot. One car per patient is allowed to park in this lot. All other cars are to park on [address] [place] [address] Street either in the parking garage or the handicap lot. [x] To reach the [facility] lobby from [address] [place] [address], upon entering the hospital, take elevator B to the 3rd floor. EDUCATION INSTRUCTIONS:      [] Knee or hip replacement booklet & exercise pamphlets given. [] Dayanara Plummer placed in chart. [] Pre-admission Testing educational folder given  [] Incentive Spirometry,coughing & deep breathing exercises reviewed. []Medication information sheet(s)   []Fluoroscopy-Xray used in surgery reviewed with patient. Educational pamphlet placed in chart. [x]Pain: Post-op pain is normal and to be expected. You will be asked to rate your pain from 0-10(a zero is not acceptable-education is needed). Your post-op pain goal is:  [x] Ask your nurse for your pain medication. [] Joint camp offered. [] Joint replacement booklets given. [] Other:___________________________    MEDICATION INSTRUCTIONS:   [x]Bring a complete list of your medications, please write the last time you took the medicine, give this list to the nurse. [x] Take the following medications the morning of surgery with 1-2 ounces of water:see list   [x] Stop herbal supplements and vitamins 5 days before your surgery. [x] DO NOT take any diabetic medicine the morning of surgery. Follow instructions for insulin the day before surgery. [x] If you are diabetic and your blood sugar is low or you feel symptomatic, you may drink 1-2 ounces of apple juice or take a glucose tablet.   The morning of your procedure, you may call the pre-op area if you have concerns about your blood sugar [phone]. [] Use your inhalers the morning of surgery. Bring your emergency inhaler with you day of surgery. [] Follow physician instructions regarding any blood thinners you may be taking. WHAT TO EXPECT:  [x] The day of surgery you will be greeted and checked in by the [employer].  In addition, you will be registered in the [facility] by a Patient Access Representative. Please bring your photo ID and insurance card. A nurse will greet you in accordance to the time you are needed in the pre-op area to prepare you for surgery. Please do not be discouraged if you are not greeted in the order you arrive as there are many variables that are involved in patient preparation. Your patience is greatly appreciated as you wait for your nurse. Please bring in items such as: books, magazines, newspapers, electronics, or any other items  to occupy your time in the waiting area. [x]  Delays may occur with surgery and staff will make a sincere effort to keep you informed of delays. If any delays occur with your procedure, we apologize ahead of time for your inconvenience as we recognize the value of your time.

## 2021-02-14 LAB — SARS-COV-2, PCR: NOT DETECTED

## 2021-02-16 ENCOUNTER — ANESTHESIA EVENT (OUTPATIENT)
Dept: OPERATING ROOM | Age: 51
End: 2021-02-16
Payer: COMMERCIAL

## 2021-02-16 RX ORDER — SODIUM CHLORIDE 0.9 % (FLUSH) 0.9 %
10 SYRINGE (ML) INJECTION EVERY 12 HOURS SCHEDULED
Status: CANCELLED | OUTPATIENT
Start: 2021-02-16

## 2021-02-16 RX ORDER — SODIUM CHLORIDE, SODIUM LACTATE, POTASSIUM CHLORIDE, CALCIUM CHLORIDE 600; 310; 30; 20 MG/100ML; MG/100ML; MG/100ML; MG/100ML
INJECTION, SOLUTION INTRAVENOUS CONTINUOUS
Status: CANCELLED | OUTPATIENT
Start: 2021-02-16

## 2021-02-16 RX ORDER — SODIUM CHLORIDE 0.9 % (FLUSH) 0.9 %
10 SYRINGE (ML) INJECTION PRN
Status: CANCELLED | OUTPATIENT
Start: 2021-02-16

## 2021-02-16 NOTE — H&P
Patient ID: Romi Pacheco is a 44 y.o. female.     HPI  History and Physical     Patient's Name/Date of Birth: Romi Pacheco / 1970        Romi Pacheco presents for anterior and superior margin reexcision after needle localized lumpectomy for left DCIS breast cancer.      PCP: Bernard Roblero DO, Gynecologist: Dr. Ching Truong.     The lesion is located in the central position of the left breast. The lesion was discovered by mammogram. The patient has noted a change in BSE since presentation (breast pain, \"throbbing\", intermittent). Patient denies nipple discharge. Patient denies a personal history of breast cancer. Breast cancer risk factors include age, gender. Ashkenazi Confucianist Ancestry: No.     OBSTETRIC RELATED HISTORY:  Age of menarche was 14. Patient has not has had menstrual cycles since her ovary removal at age 34. Patient denies hormonal therapy. Patient is . Age of first live birth was 23. Patient did breast feed. Is patient interested in fertility information about fertility preservation?n/a     CANCER SURVEILLANCE HISTORY:  Mammograms: Yes  Breast MRI's: No   Breast Biopsies: Yes   Colonoscopy: Yes   GI Polyps: No   EGD: Yes   Pelvic Exam: Yes   Pap Smear: Yes   Dermatology: No   Lung screening: no        Have you received your flu vaccination this year? Yes  Have you received your Pneumococcal vaccination? No        Estimated body mass index is 30.61 kg/m² as calculated from the following:    Height as of this encounter: 5' 1\" (1.549 m). Weight as of this encounter: 162 lb (73.5 kg). Bra Size: 38 DD     Because violence is so common, we ask all our patients: are you in a relationship or do you live with a person who threatens, hurts, or controls you:  Patient denies.     Patient drinks little caffeinated beverages. Patient does not smoke cigarettes.  Patient does not use recreational drugs.        Past Medical History        Past Medical History: Diagnosis Date    Diabetes mellitus, type 2 ([facility] [address]              Past Surgical History         Past Surgical History:   Procedure Laterality Date    BACK SURGERY   2011    EAR SURGERY   06/2020    OVARY REMOVAL   2009            Current Facility-Administered Medications          Current Outpatient Medications   Medication Sig Dispense Refill    DULoxetine (CYMBALTA) 30 MG capsule Take 30 mg by mouth daily        traZODone (DESYREL) 100 MG tablet Take 100 mg by mouth nightly        pantoprazole (PROTONIX) 20 MG tablet Take 20 mg by mouth daily        pregabalin (LYRICA) 25 MG capsule Take 25 mg by mouth 2 times daily        naproxen (NAPROSYN) 375 MG tablet Take 1 tablet by mouth 2 times daily as needed for Pain 14 tablet 0      No current facility-administered medications for this visit.                  Allergies   Allergen Reactions    Latex Shortness Of Breath    Amoxicillin Shortness Of Breath    Iodine Swelling    Pcn [Penicillins] Hives         Family History         Family History   Problem Relation Age of Onset    Cancer Maternal Grandfather           kidney            Social History               Socioeconomic History    Marital status:        Spouse name: Not on file    Number of children: Not on file    Years of education: Not on file    Highest education level: Not on file   Occupational History    Not on file   Social Needs    Financial resource strain: Not on file    Food insecurity       Worry: Not on file       Inability: Not on file    Transportation needs       Medical: Not on file       Non-medical: Not on file   Tobacco Use    Smoking status: Never Smoker   Substance and Sexual Activity    Alcohol use: No    Drug use: No    Sexual activity: Not on file   Lifestyle    Physical activity       Days per week: Not on file       Minutes per session: Not on file    Stress: Not on file   Relationships    Social connections       Talks on phone: Not on file       Gets together: Not on file       Attends Mandaeism service: Not on file       Active member of club or organization: Not on file       Attends meetings of clubs or organizations: Not on file       Relationship status: Not on file    Intimate partner violence       Fear of current or ex partner: Not on file       Emotionally abused: Not on file       Physically abused: Not on file       Forced sexual activity: Not on file   Other Topics Concern    Not on file   Social History Narrative    Not on file            Occupation: Patient retired. No heavy lifting activities. Enjoys natural diet interventions.            Review of Systems  CONSTITUTIONAL: No fever, chills. Good appetite and energy level. ENMT: Eyes: No diplopia; Nose: No epistaxis. Mouth: No sore throat. RESPIRATORY: No hemoptysis, shortness of breath, cough. CARDIOVASCULAR: No chest pain, pressure, or palpitations. GASTROINTESTINAL: No nausea/vomiting, abdominal pain, diarrhea/constipation. No blood in the stools. GENITOURINARY: No dysuria, urinary frequency, hematuria. NEURO: No syncope, presyncope, headache. Remainder:  ROS NEGATIVE     Patient denies previous history of DVT/PE. Review of systems as noted above completely reviewed with patient. No changes.             Objective:   Physical Exam  Vitals signs and nursing note reviewed. Constitutional:       General: She is not in acute distress. Appearance: She is well-developed. She is not diaphoretic. HENT:      Head: Normocephalic and atraumatic. Eyes:      Conjunctiva/sclera: Conjunctivae normal.      Pupils: Pupils are equal, round, and reactive to light. Neck:      Musculoskeletal: Normal range of motion and neck supple. Thyroid: No thyromegaly. Trachea: No tracheal deviation. Cardiovascular:      Rate and Rhythm: Normal rate and regular rhythm. Heart sounds: Normal heart sounds. Pulmonary:      Effort: Pulmonary effort is normal. No respiratory distress. Breath sounds: Normal breath sounds. Chest:      Breasts: Breasts are symmetrical.         Right: No inverted nipple, mass, nipple discharge, skin change or tenderness. Left: Mass and skin change (ecchymoses) present. No inverted nipple, nipple discharge or tenderness. Abdominal:      General: There is no distension. Palpations: Abdomen is soft. Musculoskeletal:      Right shoulder: She exhibits normal range of motion. Left shoulder: She exhibits normal range of motion. Lymphadenopathy:      Cervical: No cervical adenopathy. Upper Body:      Right upper body: No supraclavicular adenopathy. Left upper body: No supraclavicular adenopathy. Skin:     General: Skin is warm and dry. Coloration: Skin is not pale. Findings: No erythema. Neurological:      Mental Status: She is alert and oriented to person, place, and time. Psychiatric:         Behavior: Behavior normal.         Thought Content: Thought content normal.         Judgment: Judgment normal.                        Assessment:   *44 y.o. woman who underwent  a stereotactic left breast core biopsy at central position on December 4 , 2020. Pathological evaluation completed at [place]:        11/25/2020; BILATERAL SCREENING MAMMOGRAM; [address] [facility] BIOPSY LEFT BREAST [facility]        12/8/2020; PATHOLOGY; [facility]          12/7/2020 SURGICAL PATHOLOGY REPORT                     Clinical stage 0 left breast cancer. Long discussion about options going forward. Has have already seen Dr. Nadia Kline in consultation. 1/27/21 Left needle localized lumpectomy. Diagnosis:   Breast, left 6:00, lumpectomy:   Ductal carcinoma in situ (DCIS), in a background of proliferative breast   tissue, see comment. Margins are negative for DCIS. Morphologic findings of prior biopsy cavity. Stromal fibrosis, adenosis, fibrocystic change and usual ductal   hyperplasia (UDH).      Case Summary:   Procedure: Lumpectomy   Specimen laterality: Left   Size: Estimated extent of DCIS is 5.0 mm in greatest dimension, see   comment. Histologic type: Ductal carcinoma in situ       Architectural patterns: Cribriform and solid       Nuclear grade: Grade II (intermediate)       Necrosis: Present, focal   Margins: Uninvolved by DCIS        Distance from closest margins:       1.0 mm from superior, 1.5 mm from anterior, and 2.0 mm from posterior   margins   Regional lymph nodes: No lymph nodes submitted or found   Pathologic stage classification (pTNM, AJCC eighth edition):       pTis (DCIS): Ductal carcinoma in situ       pNX   Ancillary studies: Performed on prior biopsy specimen ([id number]), and   reported as estrogen and progesterone receptor positive     Comment:   The DCIS occupies approximately 5% of the specimen. Plan: margin reexcision, left lumpectomy anterior and superior margins. The risks, benefits, expected outcomes, and alternatives to this procedure have been discussed with the patient, which include but are not limited to bleeding, infection, flap necrosis and medical complications to anesthesia or surgery such as pneumonia, heart problems, blood clots, etc. Patient also was told that with minimally invasive procedures adequate margins are not always obtained with the first operation, and she has a >20% chance of requiring a second procedure to obtain clear margins around her tumor. Patient understood and desires to undergo the procedure.

## 2021-02-17 ENCOUNTER — ANESTHESIA (OUTPATIENT)
Dept: OPERATING ROOM | Age: 51
End: 2021-02-17
Payer: COMMERCIAL

## 2021-02-17 ENCOUNTER — HOSPITAL ENCOUNTER (OUTPATIENT)
Age: 51
Setting detail: OUTPATIENT SURGERY
Discharge: HOME | End: 2021-02-17
Attending: SURGERY | Admitting: SURGERY
Payer: COMMERCIAL

## 2021-02-17 VITALS — SYSTOLIC BLOOD PRESSURE: 92 MMHG | OXYGEN SATURATION: 96 % | DIASTOLIC BLOOD PRESSURE: 59 MMHG

## 2021-02-17 VITALS
WEIGHT: 164 LBS | DIASTOLIC BLOOD PRESSURE: 78 MMHG | BODY MASS INDEX: 30.18 KG/M2 | TEMPERATURE: 96.8 F | HEART RATE: 90 BPM | HEIGHT: 62 IN | OXYGEN SATURATION: 96 % | RESPIRATION RATE: 16 BRPM | SYSTOLIC BLOOD PRESSURE: 140 MMHG

## 2021-02-17 DIAGNOSIS — Z01.812 PRE-OPERATIVE LABORATORY EXAMINATION: ICD-10-CM

## 2021-02-17 DIAGNOSIS — U07.1 COVID-19: Primary | ICD-10-CM

## 2021-02-17 LAB — METER GLUCOSE: 106 MG/DL (ref 74–99)

## 2021-02-17 PROCEDURE — 3700000001 HC ADD 15 MINUTES (ANESTHESIA): Performed by: SURGERY

## 2021-02-17 PROCEDURE — 2500000003 HC RX 250 WO HCPCS: Performed by: SURGERY

## 2021-02-17 PROCEDURE — 82962 GLUCOSE BLOOD TEST: CPT

## 2021-02-17 PROCEDURE — 2709999900 HC NON-CHARGEABLE SUPPLY: Performed by: SURGERY

## 2021-02-17 PROCEDURE — 7100000010 HC PHASE II RECOVERY - FIRST 15 MIN: Performed by: SURGERY

## 2021-02-17 PROCEDURE — 2580000003 HC RX 258: Performed by: SURGERY

## 2021-02-17 PROCEDURE — 3700000000 HC ANESTHESIA ATTENDED CARE: Performed by: SURGERY

## 2021-02-17 PROCEDURE — 3600000012 HC SURGERY LEVEL 2 ADDTL 15MIN: Performed by: SURGERY

## 2021-02-17 PROCEDURE — 88307 TISSUE EXAM BY PATHOLOGIST: CPT

## 2021-02-17 PROCEDURE — 6360000002 HC RX W HCPCS

## 2021-02-17 PROCEDURE — 2720000010 HC SURG SUPPLY STERILE: Performed by: SURGERY

## 2021-02-17 PROCEDURE — 7100000011 HC PHASE II RECOVERY - ADDTL 15 MIN: Performed by: SURGERY

## 2021-02-17 PROCEDURE — 3600000002 HC SURGERY LEVEL 2 BASE: Performed by: SURGERY

## 2021-02-17 RX ORDER — FENTANYL CITRATE 50 UG/ML
INJECTION, SOLUTION INTRAMUSCULAR; INTRAVENOUS PRN
Status: DISCONTINUED | OUTPATIENT
Start: 2021-02-17 | End: 2021-02-17 | Stop reason: SDUPTHER

## 2021-02-17 RX ORDER — BUPIVACAINE HYDROCHLORIDE AND EPINEPHRINE 5; 5 MG/ML; UG/ML
INJECTION, SOLUTION EPIDURAL; INTRACAUDAL; PERINEURAL PRN
Status: DISCONTINUED | OUTPATIENT
Start: 2021-02-17 | End: 2021-02-17 | Stop reason: ALTCHOICE

## 2021-02-17 RX ORDER — MIDAZOLAM HYDROCHLORIDE 1 MG/ML
INJECTION INTRAMUSCULAR; INTRAVENOUS PRN
Status: DISCONTINUED | OUTPATIENT
Start: 2021-02-17 | End: 2021-02-17 | Stop reason: SDUPTHER

## 2021-02-17 RX ORDER — SODIUM CHLORIDE 0.9 % (FLUSH) 0.9 %
10 SYRINGE (ML) INJECTION PRN
Status: DISCONTINUED | OUTPATIENT
Start: 2021-02-17 | End: 2021-02-17 | Stop reason: HOSPADM

## 2021-02-17 RX ORDER — KETOROLAC TROMETHAMINE 30 MG/ML
INJECTION, SOLUTION INTRAMUSCULAR; INTRAVENOUS PRN
Status: DISCONTINUED | OUTPATIENT
Start: 2021-02-17 | End: 2021-02-17 | Stop reason: SDUPTHER

## 2021-02-17 RX ORDER — LIDOCAINE HYDROCHLORIDE 20 MG/ML
INJECTION, SOLUTION INTRAVENOUS PRN
Status: DISCONTINUED | OUTPATIENT
Start: 2021-02-17 | End: 2021-02-17 | Stop reason: SDUPTHER

## 2021-02-17 RX ORDER — SODIUM CHLORIDE, SODIUM LACTATE, POTASSIUM CHLORIDE, CALCIUM CHLORIDE 600; 310; 30; 20 MG/100ML; MG/100ML; MG/100ML; MG/100ML
INJECTION, SOLUTION INTRAVENOUS CONTINUOUS
Status: DISCONTINUED | OUTPATIENT
Start: 2021-02-17 | End: 2021-02-17 | Stop reason: HOSPADM

## 2021-02-17 RX ORDER — CLINDAMYCIN PHOSPHATE 900 MG/50ML
900 INJECTION INTRAVENOUS
Status: COMPLETED | OUTPATIENT
Start: 2021-02-17 | End: 2021-02-17

## 2021-02-17 RX ORDER — PROPOFOL 10 MG/ML
INJECTION, EMULSION INTRAVENOUS CONTINUOUS PRN
Status: DISCONTINUED | OUTPATIENT
Start: 2021-02-17 | End: 2021-02-17 | Stop reason: SDUPTHER

## 2021-02-17 RX ORDER — SODIUM CHLORIDE 0.9 % (FLUSH) 0.9 %
10 SYRINGE (ML) INJECTION EVERY 12 HOURS SCHEDULED
Status: DISCONTINUED | OUTPATIENT
Start: 2021-02-17 | End: 2021-02-17 | Stop reason: HOSPADM

## 2021-02-17 RX ADMIN — PROPOFOL 75 MCG/KG/MIN: 10 INJECTION, EMULSION INTRAVENOUS at 09:14

## 2021-02-17 RX ADMIN — CLINDAMYCIN PHOSPHATE 900 MG: 900 INJECTION, SOLUTION INTRAVENOUS at 09:08

## 2021-02-17 RX ADMIN — KETOROLAC TROMETHAMINE 30 MG: 30 INJECTION, SOLUTION INTRAMUSCULAR; INTRAVENOUS at 09:45

## 2021-02-17 RX ADMIN — SODIUM CHLORIDE, POTASSIUM CHLORIDE, SODIUM LACTATE AND CALCIUM CHLORIDE: 600; 310; 30; 20 INJECTION, SOLUTION INTRAVENOUS at 09:38

## 2021-02-17 RX ADMIN — FENTANYL CITRATE 50 MCG: 50 INJECTION, SOLUTION INTRAMUSCULAR; INTRAVENOUS at 09:14

## 2021-02-17 RX ADMIN — SODIUM CHLORIDE, POTASSIUM CHLORIDE, SODIUM LACTATE AND CALCIUM CHLORIDE: 600; 310; 30; 20 INJECTION, SOLUTION INTRAVENOUS at 09:11

## 2021-02-17 RX ADMIN — LIDOCAINE HYDROCHLORIDE 100 MG: 20 INJECTION, SOLUTION INTRAVENOUS at 09:14

## 2021-02-17 RX ADMIN — MIDAZOLAM 2 MG: 1 INJECTION INTRAMUSCULAR; INTRAVENOUS at 09:11

## 2021-02-17 ASSESSMENT — PULMONARY FUNCTION TESTS
PIF_VALUE: 1
PIF_VALUE: 0
PIF_VALUE: 1
PIF_VALUE: 0
PIF_VALUE: 1
PIF_VALUE: 0
PIF_VALUE: 1
PIF_VALUE: 0
PIF_VALUE: 1

## 2021-02-17 ASSESSMENT — PAIN SCALES - GENERAL
PAINLEVEL_OUTOF10: 0

## 2021-02-17 ASSESSMENT — PAIN DESCRIPTION - DESCRIPTORS: DESCRIPTORS: STABBING;SHARP

## 2021-02-17 NOTE — ANESTHESIA PRE PROCEDURE
Department of Anesthesiology  Preprocedure Note       Name:  Tara Beverly   Age:  44 y.o.  :  1970                                          MRN:  [id number]         Date:  2021      Surgeon: Daniel Stewart):  Soniya Tipton MD    Procedure: Procedure(s):  RE-EXCISION OF LEFT BREAST  MARGINS    Medications prior to admission:   Prior to Admission medications    Medication Sig Start Date End Date Taking? Authorizing Provider   morphine (MS CONTIN) 15 MG extended release tablet Take 15 mg by mouth 2 times daily. 12/15/20  Yes Historical Provider, MD   pantoprazole (PROTONIX) 40 MG tablet Take 40 mg by mouth daily 20  Yes Historical Provider, MD   LYRICA 75 MG capsule Take 75 mg by mouth 2 times daily.  20  Yes Historical Provider, MD   metFORMIN (GLUCOPHAGE) 500 MG tablet Take 500 mg by mouth 2 times daily 20  Yes Historical Provider, MD   lisinopril (PRINIVIL;ZESTRIL) 2.5 MG tablet Take 2.5 mg by mouth daily 20  Yes Historical Provider, MD   Cholecalciferol (VITAMIN D3) 25 MCG (1000 UT) CAPS Take 1,000 Int'l Units by mouth daily 20  Yes Historical Provider, MD   DULoxetine (CYMBALTA) 60 MG extended release capsule Take 60 mg by mouth daily 12/15/20  Yes Historical Provider, MD   levothyroxine (SYNTHROID) 50 MCG tablet Take 50 mcg by mouth daily 20  Yes Historical Provider, MD   fluticasone (FLONASE) 50 MCG/ACT nasal spray by Each Nostril route daily as needed 2 sprays each nostril 20  Yes Historical Provider, MD   fexofenadine-pseudoephedrine (ALLEGRA-D 24HR) 180-240 MG per extended release tablet Take 1 tablet by mouth daily   Yes Historical Provider, MD   Probiotic Product (PRO-BIOTIC BLEND) CAPS Take 3 tablets by mouth daily   Yes Historical Provider, MD   [place]-3 1000 MG CAPS Take 3 capsules by mouth daily With Omega 7 combination capsule gel   Yes Historical Provider, MD NONFORMULARY Take by mouth daily Pre - biotic  \"mixes 1 tsp with 16 ounces of water\"   Yes Historical Provider, MD   ibuprofen (ADVIL;MOTRIN) 600 MG tablet Take 1 tablet by mouth 4 times daily as needed for Pain 1/27/21   Robert Carrizales DO   acetaminophen (TYLENOL) 500 MG tablet Take 1 tablet by mouth 4 times daily as needed for Pain 1/27/21   Robert Carrizales DO       Current medications:    Current Facility-Administered Medications   Medication Dose Route Frequency Provider Last Rate Last Admin    lactated ringers infusion   Intravenous Continuous Donald Cifuentes MD        sodium chloride flush 0.9 % injection 10 mL  10 mL Intravenous 2 times per day Donald Cifuentes MD        sodium chloride flush 0.9 % injection 10 mL  10 mL Intravenous PRN Donald Cifuentes MD        clindamycin (CLEOCIN) 900 mg in dextrose 5 % 50 mL IVPB  900 mg Intravenous On Call to [address] [place] [address], MD           Allergies: Allergies   Allergen Reactions    Latex Shortness Of Breath    Amoxicillin Shortness Of Breath    Iodine Swelling    Pcn [Penicillins] Hives       Problem List:    Patient Active Problem List   Diagnosis Code    Ductal carcinoma in situ (DCIS) of left breast D05.12    COVID-19 U07.1       Past Medical History:        Diagnosis Date    Diabetes mellitus, type 2 ([facility] [address]        Past Surgical History:        Procedure Laterality Date    BACK SURGERY  2011    BREAST LUMPECTOMY Left 1/27/2021    LEFT BREAST NEEDLE LOCALIZED  LUMPECTOMY --TRIDENT performed by Donald Cifuentes MD at [address] [place] [address] SURGERY  06/2020    ENDOMETRIAL ABLATION  2008    OVARY REMOVAL  2009    SPINAL FUSION  07/17/2011    360 fusion L5-S1       Social History:    Social History     Tobacco Use    Smoking status: Never Smoker    Smokeless tobacco: Never Used   Substance Use Topics    Alcohol use:  No                                Counseling given: Not Answered      Vital Signs (Current):   Vitals:    02/17/21 0658   BP: 119/78   Pulse: 98 Cardiovascular:Negative CV ROS            Rhythm: regular  Rate: normal                    Neuro/Psych:   (+) depression/anxiety             GI/Hepatic/Renal:   (+) GERD: well controlled,           Endo/Other:    (+) DiabetesType II DM, , malignancy/cancer (breast). Abdominal:           Vascular: negative vascular ROS. Anesthesia Plan      MAC     ASA 3       Induction: intravenous. Anesthetic plan and risks discussed with patient. Use of blood products discussed with patient whom consented to blood products. Plan discussed with CRNA and attending.                   Corey Starks RN   2/17/2021

## 2021-02-17 NOTE — PROGRESS NOTES
Admitted to Same Day Surgery Unit. Preop instructions given to patient & family. Covid Test done:  yes    Results: not detected    Self-quarantine guidelines followed since tested?  Yes     Any unusual S/S or concerns expressed or observed? none

## 2021-02-17 NOTE — OP NOTE
Operative Note      Patient: Aziza Woods  YOB: 1970  MRN: [id number]    Date of Procedure: 2/17/2021    Pre-Op Diagnosis: BREAST CANCER    Post-Op Diagnosis: Same       Procedure(s):  RE-EXCISION OF LEFT BREAST  MARGINS, anterior and superior partial mastectomy    Surgeon(s):  Consuelo Hamilton MD    Assistant:   Resident: Francis Garcia DO    Anesthesia: Monitor Anesthesia Care    Estimated Blood Loss (mL): Minimal    Complications: None    Specimens:   ID Type Source Tests Collected by Time Destination   A : anterior superior left breast margin  Tissue Tissue SURGICAL PATHOLOGY Consuelo Hamilton MD 2/17/2021 0940        Implants:  * No implants in log *      Drains: * No LDAs found *    Findings: anterior and superior margins were re-excised and sent for pathology     Detailed Description of Procedure:   DATE OF PROCEDURE: 2/17/2021     SURGEON: Nishant Curtis M.D. PROCEDURE: The patient was brought to the operating room and positioned supine on the OR table. Sequential compression devices were placed on the patient's lower extremities and functioning. Preoperative antibiotics were administered. Anesthesia was obtained without complication as per the anesthesia record. Immediately prior to the procedure a time-out was called and the surgical checklist was reviewed and agreed upon by all present. The patient was prepped and draped in the usual sterile fashion and the procedure went forth with strict aseptic technique under maximal barrier precautions. An elliptical incision was made through the previous infra-areolar incision using the PEAK Plasma Blade. Dissection was continued into the previous dissection cavity using the Plasma Blade down to the clips placed from the previous surgery. Once clips were identified, the cavity was inspected. The anterior and superior margins were marked using a PlasmaBlade.   Dissection was carried down through these margins and into these planes using the Plasma Blade. Excellent margins were obtained. Bleeding points were all cauterized. The cavity was packed with a lap sponge. The specimen was removed and brought to the back table where colors of paint were applied to orient it. The wound was irrigated, inspected, bleeding points were cauterized. Once hemostasis was obtained, the new margins were marked with stainless steel clips. The cavity was approximated using a few interrupted 4-0 Vicryl sutures. The dermis was approximated with interrupted 4-0 Vicryl sutures and the skin was approximated with a running subcuticular 5-0 Vicryl suture. Needle, sponge, and instrument counts were reported as correct x2. The patient tolerated the procedure well without complications. Dr. Sonya Fleischer was present and scrubbed throughout the case. DISPOSITION: Anesthesia was discontinued and the patient was extubated prior to leaving the OR. She was transferred to the recovery area in good condition. Discharge to home is expected following appropriate post-anesthesia recovery.     Electronically signed by Sima Edouard DO on 2/17/21 at 10:04 AM EST

## 2021-02-19 ENCOUNTER — TELEPHONE (OUTPATIENT)
Dept: BREAST CENTER | Age: 51
End: 2021-02-19

## 2021-02-23 ENCOUNTER — TELEPHONE (OUTPATIENT)
Dept: BREAST CENTER | Age: 51
End: 2021-02-23

## 2021-02-23 NOTE — TELEPHONE ENCOUNTER
Faxed patient information to [facility] department - per their request to update patient records

## 2021-03-01 ENCOUNTER — TELEPHONE (OUTPATIENT)
Dept: BREAST CENTER | Age: 51
End: 2021-03-01

## 2021-03-01 NOTE — TELEPHONE ENCOUNTER
Patient was scheduled for a post op visit 3/1/21 - patient did no show for appointment. Left a message on patient voice mail -- awaiting a return call to reschedule.

## 2021-03-22 ENCOUNTER — OFFICE VISIT (OUTPATIENT)
Dept: BREAST CENTER | Age: 51
End: 2021-03-22
Payer: COMMERCIAL

## 2021-03-22 VITALS
OXYGEN SATURATION: 98 % | HEIGHT: 62 IN | WEIGHT: 164 LBS | DIASTOLIC BLOOD PRESSURE: 62 MMHG | BODY MASS INDEX: 30.18 KG/M2 | RESPIRATION RATE: 18 BRPM | SYSTOLIC BLOOD PRESSURE: 120 MMHG | HEART RATE: 89 BPM | TEMPERATURE: 97 F

## 2021-03-22 DIAGNOSIS — Z48.817 AFTERCARE FOLLOWING SURGERY OF THE SKIN OR SUBCUTANEOUS TISSUE: Primary | ICD-10-CM

## 2021-03-22 PROCEDURE — 99024 POSTOP FOLLOW-UP VISIT: CPT | Performed by: SURGERY

## 2021-03-22 NOTE — PROGRESS NOTES
Subjective:      Patient ID: Dontae Staley is a 44 y.o. female. HPI  Patient is here for a post-operative visit. She underwent re-excision of left breast margins on February 17, 2021. Pathological evaluation completed at [facility]):    Pathology report discussed. Diagnosis:   Left breast, anterior superior: Small focus of atypical ductal   hyperplasia (ADH) and usual ductal hyperplasia.      Negative for invasive or in situ carcinoma.        Inked margins of excision free of hyperplasia or neoplasia.                                    Past Medical History:   Diagnosis Date    Diabetes mellitus, type 2 ([facility] [address]        Past Surgical History:   Procedure Laterality Date    BACK SURGERY  2011    BREAST LUMPECTOMY Left 1/27/2021    LEFT BREAST NEEDLE LOCALIZED  LUMPECTOMY --TRIDENT performed by Pepe Hughes MD at [facility] 2/17/2021    RE-EXCISION OF LEFT BREAST  MARGINS performed by Pepe Hughes MD at [address] [place] [address] [address]  06/2020    ENDOMETRIAL ABLATION  2008    OVARY REMOVAL  2009    SPINAL FUSION  07/17/2011    360 fusion L5-S1       Current Outpatient Medications   Medication Sig Dispense Refill    ibuprofen (ADVIL;MOTRIN) 600 MG tablet Take 1 tablet by mouth 4 times daily as needed for Pain 40 tablet 0    acetaminophen (TYLENOL) 500 MG tablet Take 1 tablet by mouth 4 times daily as needed for Pain 120 tablet 0    morphine (MS CONTIN) 15 MG extended release tablet Take 15 mg by mouth 2 times daily.  pantoprazole (PROTONIX) 40 MG tablet Take 40 mg by mouth daily      LYRICA 75 MG capsule Take 75 mg by mouth 2 times daily.       metFORMIN (GLUCOPHAGE) 500 MG tablet Take 500 mg by mouth 2 times daily      lisinopril (PRINIVIL;ZESTRIL) 2.5 MG tablet Take 2.5 mg by mouth daily      Cholecalciferol (VITAMIN D3) 25 MCG (1000 UT) CAPS Take 1,000 Int'l Units by mouth daily      DULoxetine (CYMBALTA) 60 MG extended release capsule Take 60 mg by mouth daily      levothyroxine (SYNTHROID) 50 MCG tablet Take 50 mcg by mouth daily      fluticasone (FLONASE) 50 MCG/ACT nasal spray by Each Nostril route daily as needed 2 sprays each nostril      fexofenadine-pseudoephedrine (ALLEGRA-D 24HR) 180-240 MG per extended release tablet Take 1 tablet by mouth daily      Probiotic Product (PRO-BIOTIC BLEND) CAPS Take 3 tablets by mouth daily      Omega-3 1000 MG CAPS Take 3 capsules by mouth daily With Omega 7 combination capsule gel      NONFORMULARY Take by mouth daily Pre - biotic  \"mixes 1 tsp with 16 ounces of water\"       No current facility-administered medications for this visit.         Allergies   Allergen Reactions    Latex Shortness Of Breath    Amoxicillin Shortness Of Breath    Iodine Swelling    Pcn [Penicillins] Hives       Family History   Problem Relation Age of Onset    Cancer Maternal Grandfather         kidney       Social History     Socioeconomic History    Marital status:      Spouse name: Not on file    Number of children: Not on file    Years of education: Not on file    Highest education level: Not on file   Occupational History    Not on file   Social Needs    Financial resource strain: Not on file    Food insecurity     Worry: Not on file     Inability: Not on file    Transportation needs     Medical: Not on file     Non-medical: Not on file   Tobacco Use    Smoking status: Never Smoker    Smokeless tobacco: Never Used   Substance and Sexual Activity    Alcohol use: No    Drug use: No    Sexual activity: Not on file   Lifestyle    Physical activity     Days per week: Not on file     Minutes per session: Not on file    Stress: Not on file   Relationships    Social connections     Talks on phone: Not on file     Gets together: Not on file     Attends [employer] service: Not on file     Active member of club or organization: Not on file     Attends meetings of clubs or organizations: Not on file     Relationship status: Not on file  Intimate partner violence     Fear of current or ex partner: Not on file     Emotionally abused: Not on file     Physically abused: Not on file     Forced sexual activity: Not on file   Other Topics Concern    Not on file   Social History Narrative    Not on file     Review of Systems  The patient voices no complaints. With questioning, she denies significant pain, fever, chills, wound drainage or discharge. Objective:   Physical Exam  Well-healed breast and axillary incisions. No erythema, drainage, or palpable seroma formation. Excellent range of motion ipsilateral arm. No arm swelling or discomfort. Assessment:      44 y.o. woman who underwent underwent re-excision of left breast margins on February 17, 2021. Pathological evaluation completed at [facility]    Pathology report discussed. Diagnosis:   Left breast, anterior superior: Small focus of atypical ductal   hyperplasia (ADH) and usual ductal hyperplasia.      Negative for invasive or in situ carcinoma.        Inked margins of excision free of hyperplasia or neoplasia.                                          Clinical prognostic stage 0 left breast cancer. Has already seen medical oncology in consultation. Will be scheduled to see radiation oncology. Follow-up here in 6 months and as needed. Plan:      Plan:   1. Patient instructed to keep incision clean and dry well after bathing. Patient can start scar massage once incision is completely healed and strong enough to handle the motion (usually 10 - 14 days post operatively). Rub in a circular motion on and around the scar with firm, even pressure for 5 minutes four times per day. Use lotion or moisturizer to do the scar massage to allow ease with motion over the scar and prevent friction at the area. 2. Continue monthly breast self examination. Bring any changes to your physician's attention. 3. Routine screening imaging in November 2020 after the 25th.   4. Range of motion exercises for left shoulder encouraged. Lymphedema precautions discussed. 5. Education/Lifestyle recommendations: A regular exercise program is encouraged. Avoid excessive caffeine intake. Maintain a diet rich in vegetables and fruits avoiding processed and fast food. 6. Consultations: Oncology appointment will be scheduled with Oncologist of patient's and/or PCP's preference. 7. Continue regular appointments with PCP & Gynecologist.  8. Office follow-up visit should be scheduled in 6  months and PRN. I personally and independently saw and examined patient and reviewed all pertinent laboratory data and imaging studies. I have reviewed and agree with the CNP history and review of systems as documented in the above note. This document is generated, in part, by voice recognition software and thus syntax and grammatical errors are possible. Rose Jim MD [facility]  March 22, 2021

## 2021-09-20 ASSESSMENT — ENCOUNTER SYMPTOMS
ABDOMINAL PAIN: 0
SORE THROAT: 0
SINUS PAIN: 0
CONSTIPATION: 0
SINUS PRESSURE: 0
DIARRHEA: 0
VOMITING: 0
TROUBLE SWALLOWING: 0
NAUSEA: 0
COUGH: 0
BLOOD IN STOOL: 0
EYE DISCHARGE: 0
CHOKING: 0
RHINORRHEA: 0
VOICE CHANGE: 0
ABDOMINAL DISTENTION: 0
CHEST TIGHTNESS: 0
BACK PAIN: 0
WHEEZING: 0
EYE ITCHING: 0
SHORTNESS OF BREATH: 0

## 2021-09-20 NOTE — PROGRESS NOTES
Subjective:      Patient ID: Jose Sutton is a 46 y.o. female. JADA Jarvis is a pleasant 46 y.o. female presents for follow-up of left breast DCIS. The lesion was noted in the central portion of the left breast was noted by mammogram.    11/25/2020; BILATERAL SCREENING MAMMOGRAM; [facility]             STEREOTATIC BIOPSY LEFT BREAST [facility]        12/8/2020; PATHOLOGY; [facility]          12/7/2020 SURGICAL PATHOLOGY REPORT            01/27/21 Left needle localized lumpectomy. Diagnosis:   Breast, left 6:00, lumpectomy:   Ductal carcinoma in situ (DCIS), in a background of proliferative breast   tissue, see comment. Margins are negative for DCIS. Morphologic findings of prior biopsy cavity. Stromal fibrosis, adenosis, fibrocystic change and usual ductal   hyperplasia (UDH). Case Summary:   Procedure: Lumpectomy   Specimen laterality: Left   Size: Estimated extent of DCIS is 5.0 mm in greatest dimension, see   comment. Histologic type: Ductal carcinoma in situ       Architectural patterns: Cribriform and solid       Nuclear grade: Grade II (intermediate)       Necrosis: Present, focal   Margins: Uninvolved by DCIS        Distance from closest margins:       1.0 mm from superior, 1.5 mm from anterior, and 2.0 mm from posterior   margins   Regional lymph nodes: No lymph nodes submitted or found   Pathologic stage classification (pTNM, AJCC eighth edition):       pTis (DCIS): Ductal carcinoma in situ       pNX   Ancillary studies: Performed on prior biopsy specimen ([id number]), and   reported as estrogen and progesterone receptor positive     Comment:   The DCIS occupies approximately 5% of the specimen.      -02/17/2021 reexcision of left breast margins:  Diagnosis:   Left breast, anterior superior: Small focus of atypical ductal   hyperplasia (ADH) and usual ductal hyperplasia.         Negative for invasive or in situ carcinoma.        Inked margins of excision free of 50 mcg by mouth daily      fluticasone (FLONASE) 50 MCG/ACT nasal spray by Each Nostril route daily as needed 2 sprays each nostril      fexofenadine-pseudoephedrine (ALLEGRA-D 24HR) 180-240 MG per extended release tablet Take 1 tablet by mouth daily      Probiotic Product (PRO-BIOTIC BLEND) CAPS Take 3 tablets by mouth daily      Omega-3 1000 MG CAPS Take 3 capsules by mouth daily With Omega 7 combination capsule gel      NONFORMULARY Take by mouth daily Pre - biotic  \"mixes 1 tsp with 16 ounces of water\"       No current facility-administered medications on file prior to visit. Review of Systems   Constitutional: Negative for activity change, appetite change, chills, fatigue, fever and unexpected weight change. Doing well. Tolerating Arimidex well. On Calcium and Vit D daily. HENT: Negative for congestion, postnasal drip, rhinorrhea, sinus pressure, sinus pain, sore throat, trouble swallowing and voice change. Eyes: Negative for discharge, itching and visual disturbance. Respiratory: Negative for cough, choking, chest tightness, shortness of breath and wheezing. Cardiovascular: Negative for chest pain, palpitations and leg swelling. Gastrointestinal: Negative for abdominal distention, abdominal pain, blood in stool, constipation, diarrhea, nausea and vomiting. Endocrine: Negative for cold intolerance and heat intolerance. Genitourinary: Negative for difficulty urinating, dysuria, frequency and hematuria. Musculoskeletal: Negative for arthralgias, back pain, gait problem, joint swelling, myalgias, neck pain and neck stiffness. Allergic/Immunologic: Negative for environmental allergies and food allergies. Neurological: Negative for dizziness, seizures, syncope, speech difficulty, weakness, light-headedness and headaches. Hematological: Negative for adenopathy. Does not bruise/bleed easily.    Psychiatric/Behavioral: Negative for agitation, confusion and decreased invasive or in situ carcinoma.        Inked margins of excision free of hyperplasia or neoplasia.     -06/11/2021 Completed Adjuvant radiation therapy  -07/01/2021 Endocrine therapy with Arimidex 1 mg daily by mouth.  -09/30/2021 clinical follow-up: She is recovering from radiation therapy quite nicely. We reviewed the importance of breast massage, wearing a good supportive bra, and BSE in detail. Also reviewed NCCN guidelines for breast cancer follow-up. Plan:   1. Continue monthly breast/chest wall self examination; detailed instructions reviewed today. Bring any changes to your physician's attention. 2. Continue healthy diet and exercise routinely as tolerated. 3. Maintaining ideal body weight (20-25 BMI) may lead to optimal breast cancer outcomes. 4. Avoid alcohol or limit to 3 drinks or less per week. .    5. Repeat mammogram December 2021  6. Continue Arimidex 1 mg daily at the discretion of medical oncology team.  7. Ca+/VitD while on Arimidex. 8. Continue follow up with Medical Oncology and Primary Care. 9. RTC December with mammogram same day. (Around the first)    During today's visit, face-to-face time 25 minutes, greater than 50% in counseling education and coordination of care. All questions were answered to her apparent satisfaction, and she is agreeable to the plan as outlined above. Yrn Rick RN, MSN, APRN-CNP, [address]  Advanced Oncology Certified Nurse Practitioner  Department of Breast Surgery  [facility]/  Care in collaboration with Dr. Kristin Petty.  [place] [id number] [facility], APRN - CNP

## 2021-09-28 ENCOUNTER — TELEPHONE (OUTPATIENT)
Dept: BREAST CENTER | Age: 51
End: 2021-09-28

## 2021-09-28 NOTE — TELEPHONE ENCOUNTER
Attempted to reach patient in reference to her missed appointment today. Unable to leave voicemail message. Left SMS message.

## 2021-09-30 ENCOUNTER — OFFICE VISIT (OUTPATIENT)
Dept: BREAST CENTER | Age: 51
End: 2021-09-30
Payer: COMMERCIAL

## 2021-09-30 VITALS
WEIGHT: 160 LBS | DIASTOLIC BLOOD PRESSURE: 60 MMHG | OXYGEN SATURATION: 99 % | SYSTOLIC BLOOD PRESSURE: 108 MMHG | RESPIRATION RATE: 18 BRPM | TEMPERATURE: 98 F | HEIGHT: 62 IN | BODY MASS INDEX: 29.44 KG/M2 | HEART RATE: 92 BPM

## 2021-09-30 DIAGNOSIS — Z12.31 VISIT FOR SCREENING MAMMOGRAM: Primary | ICD-10-CM

## 2021-09-30 PROBLEM — N60.99 ATYPICAL DUCTAL HYPERPLASIA OF BREAST: Status: ACTIVE | Noted: 2021-09-30

## 2021-09-30 PROCEDURE — 99213 OFFICE O/P EST LOW 20 MIN: CPT | Performed by: NURSE PRACTITIONER

## 2021-09-30 PROCEDURE — G9899 SCRN MAM PERF RSLTS DOC: HCPCS | Performed by: NURSE PRACTITIONER

## 2021-09-30 PROCEDURE — G8427 DOCREV CUR MEDS BY ELIG CLIN: HCPCS | Performed by: NURSE PRACTITIONER

## 2021-09-30 PROCEDURE — 3017F COLORECTAL CA SCREEN DOC REV: CPT | Performed by: NURSE PRACTITIONER

## 2021-09-30 PROCEDURE — 1036F TOBACCO NON-USER: CPT | Performed by: NURSE PRACTITIONER

## 2021-09-30 PROCEDURE — G8417 CALC BMI ABV UP PARAM F/U: HCPCS | Performed by: NURSE PRACTITIONER

## 2021-09-30 RX ORDER — ANASTROZOLE 1 MG/1
1 TABLET ORAL DAILY
COMMUNITY
Start: 2021-06-14

## 2021-09-30 NOTE — PATIENT INSTRUCTIONS

## 2021-11-23 ASSESSMENT — ENCOUNTER SYMPTOMS
EYE DISCHARGE: 0
VOMITING: 0
SORE THROAT: 0
ABDOMINAL DISTENTION: 0
WHEEZING: 0
BACK PAIN: 0
EYE ITCHING: 0
DIARRHEA: 0
SINUS PAIN: 0
BLOOD IN STOOL: 0
CONSTIPATION: 0
SINUS PRESSURE: 0
COUGH: 0
CHOKING: 0
SHORTNESS OF BREATH: 0
ABDOMINAL PAIN: 0
RHINORRHEA: 0
TROUBLE SWALLOWING: 0
NAUSEA: 0
VOICE CHANGE: 0
CHEST TIGHTNESS: 0

## 2021-11-23 NOTE — PROGRESS NOTES
Subjective:      Patient ID: Maria Ines Shabazz is a 46 y.o. female. JADA Ventura is a pleasant 46 y.o. female presents for follow-up of left breast DCIS. The lesion was noted in the central portion of the left breast was noted by mammogram.    11/25/2020; BILATERAL SCREENING MAMMOGRAM; [facility]             STEREOTATIC BIOPSY LEFT BREAST [facility]        12/8/2020; PATHOLOGY; [facility]          12/7/2020 SURGICAL PATHOLOGY REPORT            01/27/21 Left needle localized lumpectomy. Diagnosis:   Breast, left 6:00, lumpectomy:   Ductal carcinoma in situ (DCIS), in a background of proliferative breast   tissue, see comment. Margins are negative for DCIS. Morphologic findings of prior biopsy cavity. Stromal fibrosis, adenosis, fibrocystic change and usual ductal   hyperplasia (UDH). Case Summary:   Procedure: Lumpectomy   Specimen laterality: Left   Size: Estimated extent of DCIS is 5.0 mm in greatest dimension, see   comment. Histologic type: Ductal carcinoma in situ       Architectural patterns: Cribriform and solid       Nuclear grade: Grade II (intermediate)       Necrosis: Present, focal   Margins: Uninvolved by DCIS        Distance from closest margins:       1.0 mm from superior, 1.5 mm from anterior, and 2.0 mm from posterior   margins   Regional lymph nodes: No lymph nodes submitted or found   Pathologic stage classification (pTNM, AJCC eighth edition):       pTis (DCIS): Ductal carcinoma in situ       pNX   Ancillary studies: Performed on prior biopsy specimen ([id number]), and   reported as estrogen and progesterone receptor positive     Comment:   The DCIS occupies approximately 5% of the specimen.      -02/17/2021 reexcision of left breast margins:  Diagnosis:   Left breast, anterior superior: Small focus of atypical ductal   hyperplasia (ADH) and usual ductal hyperplasia.         Negative for invasive or in situ carcinoma.        Inked margins of excision free of hyperplasia or neoplasia.     -06/11/2021 Completed Adjuvant radiation therapy  -Endocrine therapy with Arimidex started approximately July 1, 2021    Past Medical History:   Diagnosis Date    Diabetes mellitus, type 2 [facility])      Past Surgical History:   Procedure Laterality Date    BACK SURGERY  2011    BREAST LUMPECTOMY Left 1/27/2021    LEFT BREAST NEEDLE LOCALIZED  LUMPECTOMY --TRIDENT performed by Timoteo Escalante MD at [facility] Left 2/17/2021    RE-EXCISION OF LEFT BREAST  MARGINS performed by Timoteo Escalante MD at [facility] [address]  06/2020    ENDOMETRIAL ABLATION  2008    OVARY REMOVAL  2009    SPINAL FUSION  07/17/2011    360 fusion L5-S1     Social History     Tobacco Use    Smoking status: Never Smoker    Smokeless tobacco: Never Used   Vaping Use    Vaping Use: Never used   Substance Use Topics    Alcohol use: No    Drug use: No     Allergies   Allergen Reactions    Latex Shortness Of Breath    Amoxicillin Shortness Of Breath    Ethinyl Estradiol Other (See Comments)     Other reaction(s): Sneezing    Iodine Swelling    Levonorgestrel Other (See Comments)     Other reaction(s): Sneezing    Pcn [Penicillins] Hives     Current Outpatient Medications on File Prior to Visit   Medication Sig Dispense Refill    UNABLE TO FIND Take 1 tablet by mouth daily Taking 1 daily: Protandiem Nrf 2 activator      anastrozole (ARIMIDEX) 1 MG tablet Take 1 tablet by mouth daily      ibuprofen (ADVIL;MOTRIN) 600 MG tablet Take 1 tablet by mouth 4 times daily as needed for Pain 40 tablet 0    acetaminophen (TYLENOL) 500 MG tablet Take 1 tablet by mouth 4 times daily as needed for Pain 120 tablet 0    morphine (MS CONTIN) 15 MG extended release tablet Take 15 mg by mouth 2 times daily. Once daily      pantoprazole (PROTONIX) 40 MG tablet Take 40 mg by mouth daily      LYRICA 75 MG capsule Take 75 mg by mouth 2 times daily.       metFORMIN (GLUCOPHAGE) 500 MG tablet Take 500 mg by mouth 2 times daily      lisinopril (PRINIVIL;ZESTRIL) 2.5 MG tablet Take 2.5 mg by mouth daily      Cholecalciferol (VITAMIN D3) 25 MCG (1000 UT) CAPS Take 1,000 Int'l Units by mouth daily      DULoxetine (CYMBALTA) 60 MG extended release capsule Take 60 mg by mouth daily      levothyroxine (SYNTHROID) 50 MCG tablet Take 50 mcg by mouth daily      fluticasone (FLONASE) 50 MCG/ACT nasal spray by Each Nostril route daily as needed 2 sprays each nostril; Takes prn      fexofenadine-pseudoephedrine (ALLEGRA-D 24HR) 180-240 MG per extended release tablet Take 1 tablet by mouth daily      Probiotic Product (PRO-BIOTIC BLEND) CAPS Take 3 tablets by mouth daily      Omega-3 1000 MG CAPS Take 3 capsules by mouth daily With Omega 7 combination capsule gel      NONFORMULARY Take by mouth daily Pre - biotic  \"mixes 1 tsp with 16 ounces of water\"       No current facility-administered medications on file prior to visit. Review of Systems   Constitutional: Negative for activity change, appetite change, chills, fatigue, fever and unexpected weight change. Doing well. Continues to tolerate AI well. On Calcium and Vit D daily. HENT: Negative for congestion, postnasal drip, rhinorrhea, sinus pressure, sinus pain, sore throat, trouble swallowing and voice change. Eyes: Negative for discharge, itching and visual disturbance. Respiratory: Negative for cough, choking, chest tightness, shortness of breath and wheezing. Cardiovascular: Negative for chest pain, palpitations and leg swelling. Gastrointestinal: Negative for abdominal distention, abdominal pain, blood in stool, constipation, diarrhea, nausea and vomiting. Endocrine: Negative for cold intolerance and heat intolerance. Genitourinary: Negative for difficulty urinating, dysuria, frequency and hematuria. Musculoskeletal: Negative for arthralgias, back pain, gait problem, joint swelling, myalgias, neck pain and neck stiffness. Allergic/Immunologic: Negative for environmental allergies and food allergies. Neurological: Negative for dizziness, seizures, syncope, speech difficulty, weakness, light-headedness and headaches. Hematological: Negative for adenopathy. Does not bruise/bleed easily. Psychiatric/Behavioral: Negative for agitation, confusion and decreased concentration. The patient is not nervous/anxious. Objective:   Physical Exam  Vitals and nursing note reviewed. Constitutional:       General: She is not in acute distress. Appearance: She is well-developed. She is not diaphoretic. Comments: ECOG remains stable at 0; pleasant and conversant. HENT:      Head: Normocephalic and atraumatic. Mouth/Throat:      Pharynx: No oropharyngeal exudate. Eyes:      General: No scleral icterus. Right eye: No discharge. Left eye: No discharge. Conjunctiva/sclera: Conjunctivae normal.   Neck:      Thyroid: No thyromegaly. Vascular: No JVD. Trachea: No tracheal deviation. Cardiovascular:      Rate and Rhythm: Normal rate and regular rhythm. Heart sounds: No murmur heard. No friction rub. No gallop. Pulmonary:      Effort: Pulmonary effort is normal. No respiratory distress or retractions. Breath sounds: Normal breath sounds. No stridor. No wheezing or rales. Chest:      Chest wall: No mass, lacerations, deformity, swelling, tenderness or edema. Breasts: Breasts are symmetrical.      Right: No inverted nipple, mass, nipple discharge, skin change or tenderness. Left: No inverted nipple, mass, nipple discharge, skin change or tenderness. Comments: Right breast exam remains unremarkable  Abdominal:      General: There is no distension. Palpations: Abdomen is soft. Tenderness: There is no abdominal tenderness. There is no guarding or rebound. Musculoskeletal:         General: No tenderness or deformity. Normal range of motion.       Right shoulder: Normal.      Left shoulder: Normal.      Cervical back: Normal range of motion and neck supple. Lymphadenopathy:      Cervical: No cervical adenopathy. Right cervical: No superficial, deep or posterior cervical adenopathy. Left cervical: No superficial, deep or posterior cervical adenopathy. Upper Body:      Right upper body: No pectoral adenopathy. Left upper body: No pectoral adenopathy. Skin:     General: Skin is warm and dry. Coloration: Skin is not pale. Findings: No erythema or rash. Neurological:      Mental Status: She is alert and oriented to person, place, and time. Coordination: Coordination normal.   Psychiatric:         Behavior: Behavior normal.         Thought Content: Thought content normal.         Judgment: Judgment normal.       Assessment:    Joe Saldaña is a 46 y.o. with DCIS of the left breast diagnosed at [facility]. Serum marker studies  ER +, MT +, HER-2/keyonna -    01/27/21 Left needle localized lumpectomy. Diagnosis:   Breast, left 6:00, lumpectomy:   Ductal carcinoma in situ (DCIS), in a background of proliferative breast   tissue, see comment. Margins are negative for DCIS. Morphologic findings of prior biopsy cavity. Stromal fibrosis, adenosis, fibrocystic change and usual ductal   hyperplasia (UDH). Case Summary:   Procedure: Lumpectomy   Specimen laterality: Left   Size: Estimated extent of DCIS is 5.0 mm in greatest dimension, see   comment.    Histologic type: Ductal carcinoma in situ       Architectural patterns: Cribriform and solid       Nuclear grade: Grade II (intermediate)       Necrosis: Present, focal   Margins: Uninvolved by DCIS        Distance from closest margins:       1.0 mm from superior, 1.5 mm from anterior, and 2.0 mm from posterior   margins   Regional lymph nodes: No lymph nodes submitted or found   Pathologic stage classification (pTNM, AJCC eighth edition):       pTis (DCIS): Ductal carcinoma in situ       pNX Ancillary studies: Performed on prior biopsy specimen ([id number]), and   reported as estrogen and progesterone receptor positive     Comment:   The DCIS occupies approximately 5% of the specimen.      -02/17/2021 reexcision of left breast margins:  Diagnosis:   Left breast, anterior superior: Small focus of atypical ductal   hyperplasia (ADH) and usual ductal hyperplasia.      Negative for invasive or in situ carcinoma.        Inked margins of excision free of hyperplasia or neoplasia.     -06/11/2021 Completed Adjuvant radiation therapy  -07/01/2021 Endocrine therapy with Arimidex 1 mg daily by mouth.  -09/30/2021 clinical follow-up: She is recovering from radiation therapy quite nicely. We reviewed the importance of breast massage, wearing a good supportive bra, and BSE in detail. Also reviewed NCCN guidelines for breast cancer follow-up. -12/02/2021 bilateral screening mammogram Negative, BI-RADS 1.  -12/02/2021 clinical follow-up is without evidence of recurrent disease. Continues to tolerate AI well. Imaging reviewed, including her BI-RADS result. We reviewed the importance of breast self-exam, calcium and vitamin D daily limiting alcohol, and healthy diet and weightbearing exercise as tolerated. Plan:   1. Continue monthly breast/chest wall self examination; detailed instructions reviewed today. Bring any changes to your physician's attention. 2. Continue healthy diet and exercise routinely as tolerated. 3. Maintaining ideal body weight (20-25 BMI) may lead to optimal breast cancer outcomes. 4. Avoid alcohol or limit to 3 drinks or less per week. .    5. Repeat mammogram December 2022-not ordered. 6. Continue Arimidex 1 mg daily at the discretion of medical oncology team.  7. Ca+/VitD while on Arimidex. 8. Continue follow up with Medical Oncology and Primary Care.   9. RTC 6 months    During today's visit, face-to-face time 25 minutes, greater than 50% in counseling education and

## 2021-12-02 ENCOUNTER — HOSPITAL ENCOUNTER (OUTPATIENT)
Dept: GENERAL RADIOLOGY | Age: 51
Discharge: HOME | End: 2021-12-04
Payer: COMMERCIAL

## 2021-12-02 ENCOUNTER — OFFICE VISIT (OUTPATIENT)
Dept: BREAST CENTER | Age: 51
End: 2021-12-02
Payer: COMMERCIAL

## 2021-12-02 VITALS
RESPIRATION RATE: 16 BRPM | BODY MASS INDEX: 30 KG/M2 | HEIGHT: 62 IN | DIASTOLIC BLOOD PRESSURE: 78 MMHG | HEART RATE: 87 BPM | WEIGHT: 163 LBS | TEMPERATURE: 97.3 F | SYSTOLIC BLOOD PRESSURE: 118 MMHG | OXYGEN SATURATION: 99 %

## 2021-12-02 DIAGNOSIS — Z85.3 PERSONAL HISTORY OF BREAST CANCER: ICD-10-CM

## 2021-12-02 DIAGNOSIS — Z12.31 VISIT FOR SCREENING MAMMOGRAM: ICD-10-CM

## 2021-12-02 PROCEDURE — G8427 DOCREV CUR MEDS BY ELIG CLIN: HCPCS | Performed by: NURSE PRACTITIONER

## 2021-12-02 PROCEDURE — 77063 BREAST TOMOSYNTHESIS BI: CPT

## 2021-12-02 PROCEDURE — G8417 CALC BMI ABV UP PARAM F/U: HCPCS | Performed by: NURSE PRACTITIONER

## 2021-12-02 PROCEDURE — 99213 OFFICE O/P EST LOW 20 MIN: CPT | Performed by: NURSE PRACTITIONER

## 2021-12-02 PROCEDURE — G8484 FLU IMMUNIZE NO ADMIN: HCPCS | Performed by: NURSE PRACTITIONER

## 2021-12-02 PROCEDURE — 3017F COLORECTAL CA SCREEN DOC REV: CPT | Performed by: NURSE PRACTITIONER

## 2021-12-02 PROCEDURE — G9899 SCRN MAM PERF RSLTS DOC: HCPCS | Performed by: NURSE PRACTITIONER

## 2021-12-02 PROCEDURE — 1036F TOBACCO NON-USER: CPT | Performed by: NURSE PRACTITIONER

## 2022-05-26 ENCOUNTER — TELEPHONE (OUTPATIENT)
Dept: BREAST CENTER | Age: 52
End: 2022-05-26

## 2022-05-26 NOTE — TELEPHONE ENCOUNTER
Left another message with callback number to reschedule her appointment on June 2 with Nayeli Kaminski CNP.

## 2022-06-01 ENCOUNTER — TELEPHONE (OUTPATIENT)
Dept: BREAST CENTER | Age: 52
End: 2022-06-01

## 2022-06-01 NOTE — TELEPHONE ENCOUNTER
Attempted to call patient again, appointment needs changed to to provider availability. Unable to leave message but number left with STEPHANIA TAYLOR [facility].     Electronically signed by Olivia Smith RN on 6/1/22 at 1:50 PM EDT

## (undated) DEVICE — TUBING, SUCTION, 3/16" X 12', STRAIGHT: Brand: MEDLINE

## (undated) DEVICE — PLASMABLADE PS210-030S 3.0S LOCK: Brand: PLASMABLADE™

## (undated) DEVICE — 4-PORT MANIFOLD: Brand: NEPTUNE 2

## (undated) DEVICE — PACK,UNIVERSAL,NO GOWNS: Brand: MEDLINE

## (undated) DEVICE — SURGICAL PROCEDURE PACK BASIC

## (undated) DEVICE — Device

## (undated) DEVICE — DRAPE THER FLUID WARMING 66X44 IN FLAT SLUSH DBL DISC ORS

## (undated) DEVICE — PACK,UNIV, II AURORA: Brand: MEDLINE

## (undated) DEVICE — GLOVE SURG L12IN SZ 65FNGR THK94MIL TRNSLUC YEL LTX

## (undated) DEVICE — SET INST MINOR PROCEDURE

## (undated) DEVICE — STANDARD HYPODERMIC NEEDLE,ALUMINUM HUB: Brand: MONOJECT

## (undated) DEVICE — APPLICATOR MEDICATED 26 CC SOLUTION HI LT ORNG CHLORAPREP

## (undated) DEVICE — MARKER SURG MARGIN STD 6 CLR INK ASST CORR CLP

## (undated) DEVICE — TOWEL,OR,DSP,ST,BLUE,STD,6/PK,12PK/CS: Brand: MEDLINE

## (undated) DEVICE — GARMENT,MEDLINE,DVT,INT,CALF,MED, GEN2: Brand: MEDLINE

## (undated) DEVICE — PATIENT RETURN ELECTRODE, SINGLE-USE, CONTACT QUALITY MONITORING, ADULT, WITH 9FT CORD, FOR PATIENTS WEIGING OVER 33LBS. (15KG): Brand: MEGADYNE

## (undated) DEVICE — GOWN,SIRUS,FABRNF,L,20/CS: Brand: MEDLINE

## (undated) DEVICE — DRESSING BORDERED ADH GZ UNIV GEN USE 5IN 4IN AND 2 1/2IN

## (undated) DEVICE — SYRINGE MED 10ML TRNSLUC BRL PLUNG BLK MRK POLYPR CTRL

## (undated) DEVICE — STRIP,CLOSURE,WOUND,MEDI-STRIP,1/2X4: Brand: MEDLINE

## (undated) DEVICE — APPLIER LIG CLP M L11IN TI STR RNG HNDL FOR 20 CLP DISP